# Patient Record
Sex: MALE | Race: WHITE | NOT HISPANIC OR LATINO | Employment: OTHER | URBAN - METROPOLITAN AREA
[De-identification: names, ages, dates, MRNs, and addresses within clinical notes are randomized per-mention and may not be internally consistent; named-entity substitution may affect disease eponyms.]

---

## 2024-04-13 ENCOUNTER — HOSPITAL ENCOUNTER (EMERGENCY)
Facility: HOSPITAL | Age: 39
Discharge: HOME | End: 2024-04-13
Attending: EMERGENCY MEDICINE
Payer: COMMERCIAL

## 2024-04-13 ENCOUNTER — APPOINTMENT (OUTPATIENT)
Dept: RADIOLOGY | Facility: HOSPITAL | Age: 39
End: 2024-04-13
Payer: COMMERCIAL

## 2024-04-13 VITALS
HEART RATE: 62 BPM | BODY MASS INDEX: 25.11 KG/M2 | SYSTOLIC BLOOD PRESSURE: 122 MMHG | TEMPERATURE: 97.7 F | DIASTOLIC BLOOD PRESSURE: 82 MMHG | OXYGEN SATURATION: 99 % | RESPIRATION RATE: 16 BRPM | WEIGHT: 160 LBS | HEIGHT: 67 IN

## 2024-04-13 DIAGNOSIS — S61.411A LACERATION OF RIGHT HAND, FOREIGN BODY PRESENCE UNSPECIFIED, INITIAL ENCOUNTER: ICD-10-CM

## 2024-04-13 DIAGNOSIS — S56.021A LACERATION OF FLEXOR TENDON OF RIGHT THUMB: Primary | ICD-10-CM

## 2024-04-13 PROCEDURE — 2500000001 HC RX 250 WO HCPCS SELF ADMINISTERED DRUGS (ALT 637 FOR MEDICARE OP): Performed by: SURGERY

## 2024-04-13 PROCEDURE — 90471 IMMUNIZATION ADMIN: CPT | Performed by: SURGERY

## 2024-04-13 PROCEDURE — 99284 EMERGENCY DEPT VISIT MOD MDM: CPT | Mod: 25

## 2024-04-13 PROCEDURE — 96365 THER/PROPH/DIAG IV INF INIT: CPT | Mod: 59

## 2024-04-13 PROCEDURE — 2500000004 HC RX 250 GENERAL PHARMACY W/ HCPCS (ALT 636 FOR OP/ED): Performed by: SURGERY

## 2024-04-13 PROCEDURE — 12001 RPR S/N/AX/GEN/TRNK 2.5CM/<: CPT

## 2024-04-13 PROCEDURE — 2500000005 HC RX 250 GENERAL PHARMACY W/O HCPCS: Performed by: SURGERY

## 2024-04-13 PROCEDURE — 73130 X-RAY EXAM OF HAND: CPT | Mod: RT

## 2024-04-13 PROCEDURE — 73130 X-RAY EXAM OF HAND: CPT | Mod: RIGHT SIDE | Performed by: RADIOLOGY

## 2024-04-13 PROCEDURE — 90715 TDAP VACCINE 7 YRS/> IM: CPT | Performed by: SURGERY

## 2024-04-13 RX ORDER — LIDOCAINE HYDROCHLORIDE 10 MG/ML
20 INJECTION, SOLUTION EPIDURAL; INFILTRATION; INTRACAUDAL; PERINEURAL ONCE
Status: COMPLETED | OUTPATIENT
Start: 2024-04-13 | End: 2024-04-13

## 2024-04-13 RX ORDER — CEPHALEXIN 500 MG/1
500 CAPSULE ORAL ONCE
Status: COMPLETED | OUTPATIENT
Start: 2024-04-13 | End: 2024-04-13

## 2024-04-13 RX ORDER — CEPHALEXIN 500 MG/1
500 CAPSULE ORAL 3 TIMES DAILY
Qty: 15 CAPSULE | Refills: 0 | Status: SHIPPED | OUTPATIENT
Start: 2024-04-13 | End: 2024-04-18

## 2024-04-13 RX ADMIN — PIPERACILLIN SODIUM AND TAZOBACTAM SODIUM 3.38 G: 3; .375 INJECTION, SOLUTION INTRAVENOUS at 17:13

## 2024-04-13 RX ADMIN — LIDOCAINE HYDROCHLORIDE 200 MG: 10 INJECTION, SOLUTION EPIDURAL; INFILTRATION; INTRACAUDAL; PERINEURAL at 16:56

## 2024-04-13 RX ADMIN — CEPHALEXIN 500 MG: 500 CAPSULE ORAL at 17:59

## 2024-04-13 RX ADMIN — TETANUS TOXOID, REDUCED DIPHTHERIA TOXOID AND ACELLULAR PERTUSSIS VACCINE, ADSORBED 0.5 ML: 5; 2.5; 8; 8; 2.5 SUSPENSION INTRAMUSCULAR at 16:56

## 2024-04-13 ASSESSMENT — COLUMBIA-SUICIDE SEVERITY RATING SCALE - C-SSRS
6. HAVE YOU EVER DONE ANYTHING, STARTED TO DO ANYTHING, OR PREPARED TO DO ANYTHING TO END YOUR LIFE?: NO
2. HAVE YOU ACTUALLY HAD ANY THOUGHTS OF KILLING YOURSELF?: NO
1. IN THE PAST MONTH, HAVE YOU WISHED YOU WERE DEAD OR WISHED YOU COULD GO TO SLEEP AND NOT WAKE UP?: NO

## 2024-04-13 ASSESSMENT — PAIN - FUNCTIONAL ASSESSMENT: PAIN_FUNCTIONAL_ASSESSMENT: 0-10

## 2024-04-13 ASSESSMENT — PAIN DESCRIPTION - ORIENTATION: ORIENTATION: RIGHT

## 2024-04-13 ASSESSMENT — PAIN DESCRIPTION - LOCATION: LOCATION: FINGER (COMMENT WHICH ONE)

## 2024-04-13 ASSESSMENT — PAIN SCALES - GENERAL: PAINLEVEL_OUTOF10: 9

## 2024-04-13 NOTE — Clinical Note
Bubba Lew was seen and treated in our emergency department on 4/13/2024.  He may return to work on 04/17/2024.       If you have any questions or concerns, please don't hesitate to call.      Carlos Yuen PA-C

## 2024-04-13 NOTE — ED TRIAGE NOTES
Patient presented to ed due laceration to right thumb/ palm, patient was moving pool heater during incident. 9/10 pain.

## 2024-04-13 NOTE — ED PROVIDER NOTES
Chief Complaint   Patient presents with    Finger Laceration     HPI:   Bubba Lew is an 38 y.o. male presenting with a laceration to his right hand.  He explains that he was moving a metal pool water heater when it slipped and cut his right hand.  Denies numbness or tingling.  Patient explains that he can move all 4 fingers besides the thumb.  Denies pain elsewhere throughout the body.  Tetanus vaccination status unknown patient explains greater than 5 years.  Denies fever chills or sweats.  No nausea or vomiting.  No chest pain or shortness of breath.    Medications: Suboxone  Soc HX:  No Known Allergies:  No past medical history on file.  No past surgical history on file.  No family history on file.     Physical Exam  Vitals and nursing note reviewed.   Constitutional:       General: He is not in acute distress.     Appearance: He is well-developed.   HENT:      Head: Normocephalic and atraumatic.      Right Ear: Tympanic membrane normal.      Left Ear: Tympanic membrane normal.      Mouth/Throat:      Mouth: Mucous membranes are moist.      Pharynx: Oropharynx is clear.   Eyes:      Extraocular Movements: Extraocular movements intact.      Conjunctiva/sclera: Conjunctivae normal.      Pupils: Pupils are equal, round, and reactive to light.   Cardiovascular:      Rate and Rhythm: Normal rate and regular rhythm.      Heart sounds: No murmur heard.  Pulmonary:      Effort: Pulmonary effort is normal. No respiratory distress.      Breath sounds: Normal breath sounds.   Abdominal:      Palpations: Abdomen is soft.      Tenderness: There is no abdominal tenderness.   Musculoskeletal:         General: No swelling.      Cervical back: Neck supple.   Skin:     General: Skin is warm and dry.      Capillary Refill: Capillary refill takes less than 2 seconds. 3-second cap refill on digits 2 through 5  4 second cap refill in the thumb     Comments: 2 cm curved laceration of the thenar eminence.    Neurological:       General: No focal deficit present.      Mental Status: He is alert and oriented to person, place, and time.   Psychiatric:         Mood and Affect: Mood normal.        VS: As documented in the triage note and EMR flowsheet from this visit were reviewed.    Medical Decision Making: This is a 38-year-old male presenting with a laceration to his right thumb.  On exam the patient does not have flexion, adduction, or opposition of the right thumb.  Patient does have extension of the thumb.  Cap refill on the thumb was about 4 seconds, on the rest of the digits 2 through 5 was 3 seconds.  The other fingers were able to make a fist without issue.  Good distal pulses.  Dr. Montilla was consulted and recommend the patient get 1 dose of IV antibiotics and be given p.o. antibiotics for home and follow-up outpatient.   Patient was placed in a thumb spica splint by medic aGgan Funk.  Neurovascular intact following placement.    ED Course as of 04/13/24 1641   Sat Apr 13, 2024   1628 Dr. Montilla was consulted and recommended patient get 1 dose of IV antibiotics, close the laceration in the ED, give p.o. antibiotics outpatient and schedule an appointment to follow-up in the office [BAKARI]      ED Course User Index  [BAKARI] Carlos Yuen PA-C         Diagnoses as of 04/13/24 1641   Laceration of flexor tendon of right thumb   Laceration of right hand, foreign body presence unspecified, initial encounter     Laceration Repair    Performed by: Carlos Yuen PA-C  Authorized by: Carlos Yuen PA-C    Consent:     Consent obtained:  Verbal    Consent given by:  Patient    Risks, benefits, and alternatives were discussed: yes      Risks discussed:  Infection, need for additional repair and tendon damage  Universal protocol:     Patient identity confirmed:  Verbally with patient  Anesthesia:     Anesthesia method:  Local infiltration    Local anesthetic:  Lidocaine 1% w/o epi  Laceration details:     Location: Thenar eminence.    Length (cm):   2  Pre-procedure details:     Preparation:  Patient was prepped and draped in usual sterile fashion  Exploration:     Limited defect created (wound extended): no      Hemostasis achieved with:  Direct pressure    Imaging obtained: x-ray      Imaging outcome: foreign body not noted      Wound exploration: wound explored through full range of motion      Wound extent: muscle damage      Contaminated: no    Treatment:     Area cleansed with:  Povidone-iodine    Amount of cleaning:  Standard    Irrigation solution:  Sterile saline    Irrigation method:  Pressure wash    Debridement:  None    Undermining:  None    Scar revision: no    Skin repair:     Repair method:  Sutures    Suture size:  4-0    Suture material:  Prolene    Suture technique:  Simple interrupted  Approximation:     Approximation:  Close  Repair type:     Repair type:  Simple  Post-procedure details:     Dressing:  Splint for protection    Procedure completion:  Tolerated well, no immediate complications       Chronic Medical Conditions Significantly Affecting Care:      Escalation of Care: Appropriate for outpatient    Prescription Drug Consideration: Keflex    Counseling: Spoke with the patient and discussed today´s findings, in addition to providing specific details for the plan of care and expected course.  Patient was given the opportunity to ask questions.    Discussed return precautions and importance of follow-up.  Advised to follow-up with orthopedics.  Advised to return to the ED for changing or worsening symptoms, new symptoms, complaint specific precautions, and precautions listed on the discharge paperwork.  Educated on the common potential side effects of medications prescribed.    I advised the patient that the emergency evaluation and treatment provided today doesn't end their need for medical care. It is very important that they follow-up with their primary care provider or other specialist as instructed.    The plan of care was  mutually agreed upon with the patient. The patient and/or family were given the opportunity to ask questions. All questions asked today in the ED were answered to the best of my ability with today's information.    I specifically advised the patient to return to the ED for changing or worsening symptoms, worrisome new symptoms, or for any complaint specific precautions listed on the discharge paperwork.    This patient was cared for in the setting of nationwide stress on resources and staffing.    This report was transcribed using voice recognition software.  Every effort was made to ensure accuracy, however, inadvertently computerized transcription errors may be present.       Carlos Yuen PA-C  04/14/24 1002

## 2024-04-15 DIAGNOSIS — S56.021A LACERATION OF FLEXOR TENDON OF RIGHT THUMB: ICD-10-CM

## 2024-04-15 NOTE — H&P (VIEW-ONLY)
[facility]  Hand and Upper Extremity Service  Initial evaluation / Consultation         Consult requested by Referring Physician: ANASTASIA Reeves    Chief Complaint: Right hand injury          38 y.o otherwise healthy right hand dominant male presenting for injury of right finger. He was seen at the [facility] on 1/15/18 for an injury he sustained to the right 4th finger while working on a car. Denies any crushing injury. Sustained a laceration injury around 4/6 to flexor surface of his right thumb in which he received care in the [facility] emergency department. They irrigated and closed the wound and presents now for follow up related to previous complaint. He reports that his pain is well controlled. Reports tenderness to flexor thumb.          Please refer to New Patient Intake Form scanned into patient's electronic record for self reported past medical history, past surgical history, medications, allergies, family history, social history and 10 point review of systems    Examination:  Constitutional: Oriented to person, place, and time.  Appears well-developed and well-nourished.  Head: Normocephalic and atraumatic.  Eyes: Pupils are equal, round, and reactive to light.  Cardiovascular: Intact distal pulses.  Pulmonary/Chest/Breast: Effort normal. No respiratory distress.  Neurological: Alert and oriented to person, place, and time.  Skin: Skin is warm and dry.  Psychiatric: normal mood and affect.  Behavior is normal.  Musculoskeletal: Right hand reveals well approximated wound across palmar surface of thumb is proximal to thumb PIP joint flexion increase. Thumb is resting in abduction at thumb CMC joint and apparent and intact function of f adductor palmaris brevis. No active thumb IP joint flexion. Thumb tip is well profuse and he reports intact sensation to radial and ulnar aspect to thumb.          Personal Interpretation of Diagnostic studies: No new images obtained             Impression: Right thumb laceration with suspected FPL tendon injury           Plan: We are going to place him into a new plaster thumb Splica Splint and will get him on the operating schedule later this week for exploration for FPL repair. We discussed our anticipated post-operative course and that he may not be able to return to his former occupation for 10-12 weeks. We will submit the appropriate [facility] paperwork relative to him being out of work for that time.            In Office Procedures Performed: None                  Medications Prescribed: None                    Follow up: Will call to schedule surgery               Yves Montilla MD  [facility]  Department of Orthopaedic Surgery  Hand and Upper Extremity Reconstruction      Scribe Attestation  By signing my name below, I, Jerald Weldon , Scrazul   attest that this documentation has been prepared under the direction and in the presence of Dr. Yves Montilla.      Dictation performed with the use of voice recognition software.  Syntax and grammatical errors may exist.

## 2024-04-15 NOTE — PROGRESS NOTES
[facility]  Hand and Upper Extremity Service  Initial evaluation / Consultation         Consult requested by Referring Physician: ANASTASIA Reeves    Chief Complaint: Right hand injury          38 y.o otherwise healthy right hand dominant male presenting for injury of right finger. He was seen at the [facility] on 1/15/18 for an injury he sustained to the right 4th finger while working on a car. Denies any crushing injury. Sustained a laceration injury around 4/6 to flexor surface of his right thumb in which he received care in the [facility] emergency department. They irrigated and closed the wound and presents now for follow up related to previous complaint. He reports that his pain is well controlled. Reports tenderness to flexor thumb.          Please refer to New Patient Intake Form scanned into patient's electronic record for self reported past medical history, past surgical history, medications, allergies, family history, social history and 10 point review of systems    Examination:  Constitutional: Oriented to person, place, and time.  Appears well-developed and well-nourished.  Head: Normocephalic and atraumatic.  Eyes: Pupils are equal, round, and reactive to light.  Cardiovascular: Intact distal pulses.  Pulmonary/Chest/Breast: Effort normal. No respiratory distress.  Neurological: Alert and oriented to person, place, and time.  Skin: Skin is warm and dry.  Psychiatric: normal mood and affect.  Behavior is normal.  Musculoskeletal: Right hand reveals well approximated wound across palmar surface of thumb is proximal to thumb PIP joint flexion increase. Thumb is resting in abduction at thumb CMC joint and apparent and intact function of f adductor palmaris brevis. No active thumb IP joint flexion. Thumb tip is well profuse and he reports intact sensation to radial and ulnar aspect to thumb.          Personal Interpretation of Diagnostic studies: No new images obtained             Impression: Right thumb laceration with suspected FPL tendon injury           Plan: We are going to place him into a new plaster thumb Splica Splint and will get him on the operating schedule later this week for exploration for FPL repair. We discussed our anticipated post-operative course and that he may not be able to return to his former occupation for 10-12 weeks. We will submit the appropriate [facility] paperwork relative to him being out of work for that time.            In Office Procedures Performed: None                  Medications Prescribed: None                    Follow up: Will call to schedule surgery               Yves Montilla MD  [facility]  Department of Orthopaedic Surgery  Hand and Upper Extremity Reconstruction      Scribe Attestation  By signing my name below, I, Jerald Weldon , Scrazul   attest that this documentation has been prepared under the direction and in the presence of Dr. Yves Montilla.      Dictation performed with the use of voice recognition software.  Syntax and grammatical errors may exist.

## 2024-04-16 ENCOUNTER — OFFICE VISIT (OUTPATIENT)
Dept: ORTHOPEDIC SURGERY | Facility: CLINIC | Age: 39
End: 2024-04-16
Payer: COMMERCIAL

## 2024-04-16 VITALS — WEIGHT: 160 LBS | BODY MASS INDEX: 25.11 KG/M2 | HEIGHT: 67 IN

## 2024-04-16 DIAGNOSIS — S56.021A LACERATION OF FLEXOR TENDON OF RIGHT THUMB: Primary | ICD-10-CM

## 2024-04-16 PROCEDURE — 99214 OFFICE O/P EST MOD 30 MIN: CPT | Performed by: ORTHOPAEDIC SURGERY

## 2024-04-16 ASSESSMENT — PAIN SCALES - GENERAL: PAINLEVEL_OUTOF10: 5 - MODERATE PAIN

## 2024-04-16 ASSESSMENT — PAIN - FUNCTIONAL ASSESSMENT: PAIN_FUNCTIONAL_ASSESSMENT: 0-10

## 2024-04-18 ENCOUNTER — ANESTHESIA EVENT (OUTPATIENT)
Dept: OPERATING ROOM | Facility: CLINIC | Age: 39
End: 2024-04-18
Payer: COMMERCIAL

## 2024-04-19 ENCOUNTER — ANESTHESIA (OUTPATIENT)
Dept: OPERATING ROOM | Facility: CLINIC | Age: 39
End: 2024-04-19
Payer: COMMERCIAL

## 2024-04-19 ENCOUNTER — HOSPITAL ENCOUNTER (OUTPATIENT)
Facility: CLINIC | Age: 39
Setting detail: OUTPATIENT SURGERY
Discharge: HOME | End: 2024-04-19
Attending: ORTHOPAEDIC SURGERY | Admitting: ORTHOPAEDIC SURGERY
Payer: COMMERCIAL

## 2024-04-19 VITALS
TEMPERATURE: 98.4 F | DIASTOLIC BLOOD PRESSURE: 61 MMHG | HEIGHT: 67 IN | BODY MASS INDEX: 24.29 KG/M2 | HEART RATE: 59 BPM | OXYGEN SATURATION: 96 % | WEIGHT: 154.76 LBS | SYSTOLIC BLOOD PRESSURE: 112 MMHG | RESPIRATION RATE: 16 BRPM

## 2024-04-19 DIAGNOSIS — S56.021A LACERATION OF FLEXOR TENDON OF RIGHT THUMB: Primary | ICD-10-CM

## 2024-04-19 PROCEDURE — 2720000007 HC OR 272 NO HCPCS: Performed by: ORTHOPAEDIC SURGERY

## 2024-04-19 PROCEDURE — 7100000010 HC PHASE TWO TIME - EACH INCREMENTAL 1 MINUTE: Performed by: ORTHOPAEDIC SURGERY

## 2024-04-19 PROCEDURE — 3600000003 HC OR TIME - INITIAL BASE CHARGE - PROCEDURE LEVEL THREE: Performed by: ORTHOPAEDIC SURGERY

## 2024-04-19 PROCEDURE — 2500000004 HC RX 250 GENERAL PHARMACY W/ HCPCS (ALT 636 FOR OP/ED): Performed by: ANESTHESIOLOGY

## 2024-04-19 PROCEDURE — 2500000001 HC RX 250 WO HCPCS SELF ADMINISTERED DRUGS (ALT 637 FOR MEDICARE OP): Performed by: ANESTHESIOLOGY

## 2024-04-19 PROCEDURE — 3700000001 HC GENERAL ANESTHESIA TIME - INITIAL BASE CHARGE: Performed by: ORTHOPAEDIC SURGERY

## 2024-04-19 PROCEDURE — 2500000004 HC RX 250 GENERAL PHARMACY W/ HCPCS (ALT 636 FOR OP/ED): Performed by: NURSE ANESTHETIST, CERTIFIED REGISTERED

## 2024-04-19 PROCEDURE — A26352 PR REPAIR FLEXOR TENDON,HAND,W/ GRAFT,EA: Performed by: NURSE ANESTHETIST, CERTIFIED REGISTERED

## 2024-04-19 PROCEDURE — 15240 FTH/GFT F/C/C/M/N/AX/G/H/F20: CPT | Performed by: ORTHOPAEDIC SURGERY

## 2024-04-19 PROCEDURE — 2500000004 HC RX 250 GENERAL PHARMACY W/ HCPCS (ALT 636 FOR OP/ED): Mod: JZ | Performed by: ORTHOPAEDIC SURGERY

## 2024-04-19 PROCEDURE — 2500000004 HC RX 250 GENERAL PHARMACY W/ HCPCS (ALT 636 FOR OP/ED): Performed by: ORTHOPAEDIC SURGERY

## 2024-04-19 PROCEDURE — 3700000002 HC GENERAL ANESTHESIA TIME - EACH INCREMENTAL 1 MINUTE: Performed by: ORTHOPAEDIC SURGERY

## 2024-04-19 PROCEDURE — 3600000008 HC OR TIME - EACH INCREMENTAL 1 MINUTE - PROCEDURE LEVEL THREE: Performed by: ORTHOPAEDIC SURGERY

## 2024-04-19 PROCEDURE — A26352 PR REPAIR FLEXOR TENDON,HAND,W/ GRAFT,EA: Performed by: ANESTHESIOLOGY

## 2024-04-19 PROCEDURE — 2500000005 HC RX 250 GENERAL PHARMACY W/O HCPCS: Performed by: ANESTHESIOLOGY

## 2024-04-19 PROCEDURE — 64831 REPAIR OF DIGIT NERVE: CPT | Performed by: ORTHOPAEDIC SURGERY

## 2024-04-19 PROCEDURE — 2500000001 HC RX 250 WO HCPCS SELF ADMINISTERED DRUGS (ALT 637 FOR MEDICARE OP): Performed by: ORTHOPAEDIC SURGERY

## 2024-04-19 PROCEDURE — 2500000005 HC RX 250 GENERAL PHARMACY W/O HCPCS: Performed by: NURSE ANESTHETIST, CERTIFIED REGISTERED

## 2024-04-19 PROCEDURE — 7100000009 HC PHASE TWO TIME - INITIAL BASE CHARGE: Performed by: ORTHOPAEDIC SURGERY

## 2024-04-19 PROCEDURE — A4217 STERILE WATER/SALINE, 500 ML: HCPCS | Performed by: ORTHOPAEDIC SURGERY

## 2024-04-19 PROCEDURE — 7100000001 HC RECOVERY ROOM TIME - INITIAL BASE CHARGE: Performed by: ORTHOPAEDIC SURGERY

## 2024-04-19 PROCEDURE — 26352 REPAIR/GRAFT HAND TENDON: CPT | Performed by: ORTHOPAEDIC SURGERY

## 2024-04-19 PROCEDURE — 7100000002 HC RECOVERY ROOM TIME - EACH INCREMENTAL 1 MINUTE: Performed by: ORTHOPAEDIC SURGERY

## 2024-04-19 RX ORDER — DEXMEDETOMIDINE HYDROCHLORIDE 4 UG/ML
INJECTION, SOLUTION INTRAVENOUS CONTINUOUS PRN
Status: DISCONTINUED | OUTPATIENT
Start: 2024-04-19 | End: 2024-04-19

## 2024-04-19 RX ORDER — PHENYLEPHRINE HYDROCHLORIDE 10 MG/ML
INJECTION INTRAVENOUS AS NEEDED
Status: DISCONTINUED | OUTPATIENT
Start: 2024-04-19 | End: 2024-04-19

## 2024-04-19 RX ORDER — SODIUM CHLORIDE 0.9 G/100ML
IRRIGANT IRRIGATION AS NEEDED
Status: DISCONTINUED | OUTPATIENT
Start: 2024-04-19 | End: 2024-04-19 | Stop reason: HOSPADM

## 2024-04-19 RX ORDER — PROPOFOL 10 MG/ML
INJECTION, EMULSION INTRAVENOUS AS NEEDED
Status: DISCONTINUED | OUTPATIENT
Start: 2024-04-19 | End: 2024-04-19

## 2024-04-19 RX ORDER — HYDROCODONE BITARTRATE AND ACETAMINOPHEN 5; 325 MG/1; MG/1
1 TABLET ORAL EVERY 6 HOURS PRN
Qty: 28 TABLET | Refills: 0 | Status: SHIPPED | OUTPATIENT
Start: 2024-04-19 | End: 2024-04-26

## 2024-04-19 RX ORDER — SODIUM CHLORIDE, SODIUM LACTATE, POTASSIUM CHLORIDE, CALCIUM CHLORIDE 600; 310; 30; 20 MG/100ML; MG/100ML; MG/100ML; MG/100ML
100 INJECTION, SOLUTION INTRAVENOUS CONTINUOUS
Status: DISCONTINUED | OUTPATIENT
Start: 2024-04-19 | End: 2024-04-19 | Stop reason: HOSPADM

## 2024-04-19 RX ORDER — CEFAZOLIN SODIUM 2 G/100ML
2 INJECTION, SOLUTION INTRAVENOUS ONCE
Status: CANCELLED | OUTPATIENT
Start: 2024-04-19 | End: 2024-04-19

## 2024-04-19 RX ORDER — ONDANSETRON HYDROCHLORIDE 2 MG/ML
4 INJECTION, SOLUTION INTRAVENOUS ONCE AS NEEDED
Status: DISCONTINUED | OUTPATIENT
Start: 2024-04-19 | End: 2024-04-19 | Stop reason: HOSPADM

## 2024-04-19 RX ORDER — FENTANYL CITRATE 50 UG/ML
INJECTION, SOLUTION INTRAMUSCULAR; INTRAVENOUS AS NEEDED
Status: DISCONTINUED | OUTPATIENT
Start: 2024-04-19 | End: 2024-04-19

## 2024-04-19 RX ORDER — KETOROLAC TROMETHAMINE 30 MG/ML
INJECTION, SOLUTION INTRAMUSCULAR; INTRAVENOUS AS NEEDED
Status: DISCONTINUED | OUTPATIENT
Start: 2024-04-19 | End: 2024-04-19

## 2024-04-19 RX ORDER — LIDOCAINE IN NACL,ISO-OSMOT/PF 30 MG/3 ML
0.1 SYRINGE (ML) INJECTION ONCE
Status: DISCONTINUED | OUTPATIENT
Start: 2024-04-19 | End: 2024-04-19 | Stop reason: HOSPADM

## 2024-04-19 RX ORDER — LIDOCAINE HYDROCHLORIDE 20 MG/ML
INJECTION, SOLUTION INFILTRATION; PERINEURAL AS NEEDED
Status: DISCONTINUED | OUTPATIENT
Start: 2024-04-19 | End: 2024-04-19

## 2024-04-19 RX ORDER — BUPIVACAINE HYDROCHLORIDE 5 MG/ML
INJECTION, SOLUTION EPIDURAL; INTRACAUDAL AS NEEDED
Status: DISCONTINUED | OUTPATIENT
Start: 2024-04-19 | End: 2024-04-19 | Stop reason: HOSPADM

## 2024-04-19 RX ORDER — BACITRACIN ZINC 500 UNIT/G
OINTMENT IN PACKET (EA) TOPICAL AS NEEDED
Status: DISCONTINUED | OUTPATIENT
Start: 2024-04-19 | End: 2024-04-19 | Stop reason: HOSPADM

## 2024-04-19 RX ORDER — MIDAZOLAM HYDROCHLORIDE 1 MG/ML
INJECTION, SOLUTION INTRAMUSCULAR; INTRAVENOUS AS NEEDED
Status: DISCONTINUED | OUTPATIENT
Start: 2024-04-19 | End: 2024-04-19

## 2024-04-19 RX ORDER — FENTANYL CITRATE 50 UG/ML
25 INJECTION, SOLUTION INTRAMUSCULAR; INTRAVENOUS EVERY 5 MIN PRN
Status: DISCONTINUED | OUTPATIENT
Start: 2024-04-19 | End: 2024-04-19 | Stop reason: HOSPADM

## 2024-04-19 RX ORDER — CEFAZOLIN 1 G/1
INJECTION, POWDER, FOR SOLUTION INTRAVENOUS AS NEEDED
Status: DISCONTINUED | OUTPATIENT
Start: 2024-04-19 | End: 2024-04-19

## 2024-04-19 RX ORDER — OXYCODONE HYDROCHLORIDE 5 MG/1
5 TABLET ORAL EVERY 4 HOURS PRN
Status: DISCONTINUED | OUTPATIENT
Start: 2024-04-19 | End: 2024-04-19 | Stop reason: HOSPADM

## 2024-04-19 RX ORDER — ONDANSETRON HYDROCHLORIDE 2 MG/ML
INJECTION, SOLUTION INTRAVENOUS AS NEEDED
Status: DISCONTINUED | OUTPATIENT
Start: 2024-04-19 | End: 2024-04-19

## 2024-04-19 RX ORDER — OXYCODONE HYDROCHLORIDE 10 MG/1
10 TABLET ORAL EVERY 4 HOURS PRN
Status: DISCONTINUED | OUTPATIENT
Start: 2024-04-19 | End: 2024-04-19 | Stop reason: HOSPADM

## 2024-04-19 RX ADMIN — DEXAMETHASONE SODIUM PHOSPHATE 4 MG: 4 INJECTION, SOLUTION INTRAMUSCULAR; INTRAVENOUS at 10:51

## 2024-04-19 RX ADMIN — SODIUM CHLORIDE, SODIUM LACTATE, POTASSIUM CHLORIDE, AND CALCIUM CHLORIDE 100 ML/HR: .6; .31; .03; .02 INJECTION, SOLUTION INTRAVENOUS at 10:30

## 2024-04-19 RX ADMIN — SODIUM CHLORIDE, SODIUM LACTATE, POTASSIUM CHLORIDE, AND CALCIUM CHLORIDE: .6; .31; .03; .02 INJECTION, SOLUTION INTRAVENOUS at 10:29

## 2024-04-19 RX ADMIN — FENTANYL CITRATE 50 MCG: 50 INJECTION, SOLUTION INTRAMUSCULAR; INTRAVENOUS at 10:45

## 2024-04-19 RX ADMIN — CEFAZOLIN 2 G: 1 INJECTION, POWDER, FOR SOLUTION INTRAMUSCULAR; INTRAVENOUS at 10:49

## 2024-04-19 RX ADMIN — Medication 6 L/MIN: at 12:25

## 2024-04-19 RX ADMIN — ONDANSETRON 4 MG: 2 INJECTION INTRAMUSCULAR; INTRAVENOUS at 12:12

## 2024-04-19 RX ADMIN — MIDAZOLAM 2 MG: 1 INJECTION INTRAMUSCULAR; INTRAVENOUS at 10:42

## 2024-04-19 RX ADMIN — KETOROLAC TROMETHAMINE 30 MG: 30 INJECTION, SOLUTION INTRAMUSCULAR at 12:13

## 2024-04-19 RX ADMIN — FENTANYL CITRATE 25 MCG: 50 INJECTION, SOLUTION INTRAMUSCULAR; INTRAVENOUS at 13:00

## 2024-04-19 RX ADMIN — LIDOCAINE HYDROCHLORIDE 60 MG: 20 INJECTION, SOLUTION INFILTRATION; PERINEURAL at 10:45

## 2024-04-19 RX ADMIN — PROPOFOL 50 MG: 10 INJECTION, EMULSION INTRAVENOUS at 10:47

## 2024-04-19 RX ADMIN — PROPOFOL 200 MG: 10 INJECTION, EMULSION INTRAVENOUS at 10:45

## 2024-04-19 RX ADMIN — FENTANYL CITRATE 25 MCG: 50 INJECTION, SOLUTION INTRAMUSCULAR; INTRAVENOUS at 12:50

## 2024-04-19 RX ADMIN — OXYCODONE HYDROCHLORIDE 10 MG: 10 TABLET ORAL at 13:10

## 2024-04-19 RX ADMIN — PHENYLEPHRINE HYDROCHLORIDE 80 MCG: 10 INJECTION INTRAVENOUS at 11:17

## 2024-04-19 RX ADMIN — FENTANYL CITRATE 25 MCG: 50 INJECTION, SOLUTION INTRAMUSCULAR; INTRAVENOUS at 12:13

## 2024-04-19 SDOH — HEALTH STABILITY: MENTAL HEALTH: CURRENT SMOKER: 0

## 2024-04-19 ASSESSMENT — PAIN - FUNCTIONAL ASSESSMENT
PAIN_FUNCTIONAL_ASSESSMENT: 0-10
PAIN_FUNCTIONAL_ASSESSMENT: UNABLE TO SELF-REPORT
PAIN_FUNCTIONAL_ASSESSMENT: 0-10

## 2024-04-19 ASSESSMENT — PAIN SCALES - GENERAL
PAINLEVEL_OUTOF10: 7
PAINLEVEL_OUTOF10: 8
PAINLEVEL_OUTOF10: 3
PAINLEVEL_OUTOF10: 7
PAINLEVEL_OUTOF10: 5 - MODERATE PAIN
PAINLEVEL_OUTOF10: 8
PAINLEVEL_OUTOF10: 6

## 2024-04-19 ASSESSMENT — PAIN DESCRIPTION - ORIENTATION: ORIENTATION: RIGHT

## 2024-04-19 NOTE — OP NOTE
Tendon repair right thumb / 45 Minutes (R) Operative Note     Date: 2024  OR Location: [facility] OR    Name: Bbuba Lew, : 1985, Age: 38 y.o., MRN: [id number], Sex: male    Diagnosis  Pre-op Diagnosis     * Laceration of flexor tendon of right thumb [S56.021A] Post-op Diagnosis     * Laceration of flexor tendon of right thumb [S56.021A]     Procedures  Repair right thumb FPL tendon  Repair ulnar digital nerve right thumb  Full-thickness skin graft 25 mm x 15 mm right thenar eminence    Surgeons      * Yves Montilla - Primary    Resident/Fellow/Other Assistant:  Tito Toscano MD    Procedure Summary  Anesthesia: General  ASA: ASA status not filed in the log.  Anesthesia Staff: Anesthesiologist: Niharika Asif MD  CRNA: CÉSAR Segal-ANTON  SRNA: Ruma Sumaya  Estimated Blood Loss: 2 mL  Intra-op Medications:   Administrations occurring from 1050 to 1205 on 24:   Medication Name Total Dose   sodium chloride 0.9 % irrigation solution 150 mL   bupivacaine PF (Marcaine) 0.5 % (5 mg/mL) injection 10 mL              Anesthesia Record               Intraprocedure I/O Totals          Intake    lactated Ringer's infusion 800.00 mL    Total Intake 800 mL          Specimen: No specimens collected     Staff:   Relief Circulator: Lu Elliott RN  Relief Scrub: Laura Saul RN  Scrub Person: Zeferino Shelton         Drains and/or Catheters:   Open Drain Right;Anterior  (Active)       Tourniquet Times:     Total Tourniquet Time Documented:  Arm - Lower (Right) - 82 minutes  Total: Arm - Lower (Right) - 82 minutes      Implants:     Findings: Full-thickness skin necrosis from irregular traumatic flap, flexor tendon laceration right thumb zone 2, ulnar digital nerve injury right thumb    Indications: Bubba Lew is an 38 y.o. male who is having surgery for Laceration of flexor tendon of right thumb [S56.021A].      The patient was seen in the preoperative area. The risks, benefits, complications,  treatment options, non-operative alternatives, expected recovery and outcomes were discussed with the patient. The possibilities of reaction to medication, pulmonary aspiration, injury to surrounding structures, bleeding, recurrent infection, the need for additional procedures, failure to diagnose a condition, and creating a complication requiring transfusion or operation were discussed with the patient. The patient concurred with the proposed plan, giving informed consent.  The site of surgery was properly noted/marked if necessary per policy. The patient has been actively warmed in preoperative area. Preoperative antibiotics have been ordered and given within 1 hours of incision. Venous thrombosis prophylaxis have been ordered including bilateral sequential compression devices    Procedure Details:   Patient is a 38-year-old right-hand-dominant gentleman who sustained a traumatic laceration to his right thumb extending into the thenar eminence 6 days ago at work.  He was seen in the emergency department on day of injury and his wound was irrigated and closed.  He subsequently presented to my office earlier this week with evidence of a laceration of his FPL tendon.  He reported intact sensation at the tip of the thumb.  The thumb was well-perfused.  He presents now for exploration with tendon repair.  Preoperatively the right hand was identified and marked for surgery.  Informed consent process was completed.    Patient was brought to the operating room placed supine on the operating table.  A timeout procedure was performed to verify the correct patient, procedure and operative site.  Intravenous antibiotics were administered.  General anesthetic was initiated by the anesthesia service.  The right upper extremity was then prepped and draped in usual sterile fashion.  The limb was exsanguinated and a tourniquet was inflated to 250 mmHg.    We first inspected our wound.  The flaps were all reapproximated but the  wound was a U-shaped flap with its proximal apex just proximal to the MP joint flexion crease of the thumb.  It then extended distally along the ulnar border of the thumb out to the level of the IP joint.  The proximal aspect of this U-shaped flap was ischemic and necrotic and nonviable.    We removed the sutures.  We decompressed hematoma from the thumb.  We copiously irrigated the wounds and began with our exploration.  We very clearly identified traumatic laceration involving the ulnar neurovascular bundle.  We identified the proximal aspects of the ulnar digital nerve and ulnar digital artery.  However to further identify the distal segments we had to extend the traumatic wound in Matt fashion distally and extending it across the pulp of the thumb so we could also expose the distal aspect of the FPL tendon.  We identified now the proximal and distal aspects of the ulnar digital nerve.  Proximally we explored the radial digital nerve and this structure was intact.  We explored the flexor tendon sheath.  We identified that the A1 pulley was intact but that the oblique pulley and the majority of the A2 pulley were disrupted.  We identified the terminal aspect of the FPL tendon.  There was perhaps 15 to 20 mm of the terminal FPL tendon still attached to the distal phalanx.  We dissected then proximally into the thenar eminence and identified the retracted proximal portion of the FPL tendon.  We placed a 3-0 Ethibond suture in the proximal FPL tendon.  We are then able to use the suture strands to feed the proximal portion of the tendon underneath the A1 pulley and bring it all the way out to the terminal FPL.  The wounds were irrigated again and then we repaired the FPL tendon with a 6 strand core suture technique.  There was some slight bowstringing because of the incompetence of the oblique and A2 pulley but the A1 pulley remaining intact minimize the degree of bowstringing.    We turned our attention now to the  ulnar neurovascular bundle.  Because the thumb was so well-perfused we cauterized the proximal aspect of the ulnar digital artery.  We then debrided the ends of the ulnar digital nerve proximally distally and performed a epineurial repair that was tension-free.  This was done with 8-0 nylon under loupe magnification.    The wound was copiously irrigated again and we began to close our wound.  We are able to close the portion of the wound on the ulnar border of the thumb but as we further inspected this proximal aspect of his U-shaped flapped turning that it was nonviable we excised it with the intention of now applying a full-thickness skin graft.  Fortunately the defect left by excision of this nonviable skin was over thenar muscle.  The tendon and tendon sheath were not exposed.  The neurovascular bundles that were repaired were not exposed.    Went to the proximal aspect of the volar forearm try to find an area of the forearm that had minimal hair growth.  We harvested a full-thickness skin graft measuring the dimensions of the defect in the hand.  This was 25 mm x 15 mm.  The donor site was irrigated and then closed primarily.    We then took the skin graft to the thumb wound.  We defatted the skin graft and then inset into the defect closing this with interrupted 4-0 chromic suture.  We placed a vessel loop underneath the skin graft in an effort to allow decompression of any hematoma that would develop.    Sterile bandages were then applied.  The tourniquet was deflated.  The hemostasis was demonstrated.  The thumb was briskly reperfused.  We placed the patient in a well-padded dorsal blocking thumb spica splint with pressure applied over the thenar eminence to help create a bolster effect to her skin graft.  He was awoken from his anesthetic and transferred to recovery in stable condition.    Postoperatively he will be discharged home is comfortable.  He will remain in his splint until he returns to clinic in  4 days.  At that time we will remove his bandage and inspect his wounds.  We will refer him to therapy for wound care and rehabilitation of his flexor tendon repair.        Complications:  None; patient tolerated the procedure well.    Disposition: PACU - hemodynamically stable.  Condition: stable         Additional Details:      Attending Attestation: I was present and scrubbed for the entire procedure.    Yves Montilla  Phone Number: [phone]

## 2024-04-19 NOTE — ANESTHESIA PROCEDURE NOTES
Airway  Date/Time: 4/19/2024 10:48 AM  Urgency: elective    Airway not difficult    Staffing  Performed: attending and SRNA   Authorized by: Niharika Asif MD    Performed by: CÉSAR Segal-ANTON  Patient location during procedure: OR    Indications and Patient Condition  Indications for airway management: anesthesia and airway protection  Spontaneous ventilation: present  Sedation level: deep  Preoxygenated: yes  Patient position: sniffing  Mask difficulty assessment: 1 - vent by mask  Planned trial extubation    Final Airway Details  Final airway type: supraglottic airway      Successful airway: Supraglottic airway: I gel.  Size 4     Number of attempts at approach: 1    Additional Comments  Lips and teeth in preinduction condition

## 2024-04-19 NOTE — ANESTHESIA PREPROCEDURE EVALUATION
"Patient: Bubba Lew    Procedure Information       Anesthesia Start Date/Time: 04/19/24 1028    Procedure: Tendon repair right thumb / 45 Minutes (Right: Thumb)    Location: [facility] OR 02 / [facility]    Surgeons: Yves Montilla MD          Vitals:    04/19/24 1010   BP: 142/81   Pulse: 51   Resp: 16   Temp: 36.9 °C (98.4 °F)   SpO2: 100%       Past Surgical History:   Procedure Laterality Date   • TONSILLECTOMY       Past Medical History:   Diagnosis Date   • Asthma ([facility])    • Back pain    • Congenital spondylolisthesis    • Fibromyalgia, primary    • Laceration of flexor tendon of right thumb    • Tonsillitis        Current Facility-Administered Medications:   •  lactated Ringer's infusion, 100 mL/hr, intravenous, Continuous, Niharika Asif MD, Last Rate: 100 mL/hr at 04/19/24 1030, Continued by Anesthesia at 04/19/24 1030  Prior to Admission medications    Medication Sig Start Date End Date Taking? Authorizing Provider   albuterol sulfate (Proair Digihaler) 90 mcg/actuation aero powdr breath act w/sensor inhaler Inhale 2 puffs every 4 hours if needed.    Historical Provider, MD   cephalexin (Keflex) 500 mg capsule Take 1 capsule (500 mg) by mouth 3 times a day for 5 days. 4/13/24 4/18/24  Carlos Yuen PA-C   HYDROcodone-acetaminophen (Norco) 5-325 mg tablet Take 1 tablet by mouth every 6 hours if needed for severe pain (7 - 10) for up to 7 days. 4/19/24 4/26/24  Yves Montilla MD     Allergies   Allergen Reactions   • Penicillins Unknown     Social History     Tobacco Use   • Smoking status: Never   • Smokeless tobacco: Current     Types: Chew   Substance Use Topics   • Alcohol use: Not Currently         Chemistry    No results found for: \"NA\", \"K\", \"CL\", \"CO2\", \"BUN\", \"CREATININE\", \"GLU\" No results found for: \"CALCIUM\", \"ALKPHOS\", \"AST\", \"ALT\", \"BILITOT\"       No results found for: \"WBC\", \"HGB\", \"HCT\", \"PLT\"  No results found for: \"PROTIME\", \"PTT\", \"INR\"  No results found for this or any " previous visit (from the past 4464 hour(s)).  No results found for this or any previous visit from the past 1095 days.        Relevant Problems   No relevant active problems       Clinical information reviewed:   Tobacco  Allergies  Meds   Med Hx  Surg Hx   Fam Hx  Soc Hx        NPO Detail:  NPO/Void Status  Date of Last Liquid: 04/18/24  Time of Last Liquid: 2000  Date of Last Solid: 04/18/24  Time of Last Solid: 2000         Physical Exam    Airway  Mallampati: I  TM distance: >3 FB  Neck ROM: full     Cardiovascular   Rhythm: regular  Rate: normal     Dental - normal exam     Pulmonary   Breath sounds clear to auscultation     Abdominal        Anesthesia Plan    History of general anesthesia?: yes  History of complications of general anesthesia?: no    ASA 1     general   (Per patient, has woken up during dental surgery (most likely MAC).   Chewable tobacco use.   )  The patient is not a current smoker.    intravenous induction   Anesthetic plan and risks discussed with patient.    Plan discussed with CRNA.

## 2024-04-19 NOTE — ANESTHESIA POSTPROCEDURE EVALUATION
Patient: Bubba Lew    Procedure Summary       Date: 04/19/24 Room / Location: [facility] / [facility]    Anesthesia Start: 1028 Anesthesia Stop: 1238    Procedure: Tendon repair right thumb / 45 Minutes (Right: Thumb) Diagnosis:       Laceration of flexor tendon of right thumb      (Laceration of flexor tendon of right thumb [S56.021A])    Surgeons: Yves Montilla MD Responsible Provider: Niharika Asif MD    Anesthesia Type: general ASA Status: Not recorded            Anesthesia Type: general    Vitals Value Taken Time   /62 04/19/24 1310   Temp 36.9 °C (98.4 °F) 04/19/24 1310   Pulse 61 04/19/24 1310   Resp 16 04/19/24 1310   SpO2 97 % 04/19/24 1310       Anesthesia Post Evaluation    Patient participation: complete - patient participated  Level of consciousness: awake and alert  Pain management: adequate  Airway patency: patent  Cardiovascular status: acceptable  Respiratory status: acceptable  Hydration status: acceptable  Postoperative Nausea and Vomiting: none    No notable events documented.

## 2024-04-23 ENCOUNTER — OFFICE VISIT (OUTPATIENT)
Dept: ORTHOPEDIC SURGERY | Facility: HOSPITAL | Age: 39
End: 2024-04-23
Payer: COMMERCIAL

## 2024-04-23 ENCOUNTER — CLINICAL SUPPORT (OUTPATIENT)
Dept: OCCUPATIONAL THERAPY | Facility: HOSPITAL | Age: 39
End: 2024-04-23
Payer: COMMERCIAL

## 2024-04-23 VITALS — BODY MASS INDEX: 24.17 KG/M2 | HEIGHT: 67 IN | WEIGHT: 154 LBS

## 2024-04-23 DIAGNOSIS — S56.021A LACERATION OF FLEXOR TENDON OF RIGHT THUMB: Primary | ICD-10-CM

## 2024-04-23 DIAGNOSIS — S56.021A LACERATION OF FLEXOR TENDON OF RIGHT THUMB: ICD-10-CM

## 2024-04-23 PROCEDURE — 4004F PT TOBACCO SCREEN RCVD TLK: CPT | Performed by: ORTHOPAEDIC SURGERY

## 2024-04-23 PROCEDURE — L3808 WHFO, RIGID W/O JOINTS: HCPCS | Performed by: OCCUPATIONAL THERAPIST

## 2024-04-23 PROCEDURE — 99024 POSTOP FOLLOW-UP VISIT: CPT | Performed by: ORTHOPAEDIC SURGERY

## 2024-04-23 ASSESSMENT — PAIN - FUNCTIONAL ASSESSMENT: PAIN_FUNCTIONAL_ASSESSMENT: NO/DENIES PAIN

## 2024-04-23 NOTE — PROGRESS NOTES
Surgery(s) performed: Repair right thumb FPL tendon; Repair ulnar digital nerve right thumb; Full-thickness skin graft 25 mm x 15 mm right thenar eminence.    Patient presents for first post-operative visit since tendon nerve repair and skin grafting on 4/19/24. He is overall doing well, pain is well controlled.      Exam findings: Surgical wounds are healing nicely. Skin graft over thenar eminence has good appearance. His thumb tip is perfused.  He is insensate on the ulnar border but he has good resting posture of the thumb IP joint. His skin graft donor site on the forearm appears to be healing without adverse event       Impression: Right thumb tendon and nerve repair    Plan: We discussed our intraoperative findings and our plan moving forward. He is going to see therapy today for splinting and the initiation of flexor tendon repair rehabilitation protocol. Will follow up with me in two weeks for wound check, and at that point we should be able to remove the nylon stitches from this.       Yves Montilla MD      [facility] [employer]   Department of Orthopaedic Surgery   Chief of Hand and Upper Extremity Surgery   [facility]       Scribe Attestation  By signing my name below, IVianca Scribe, attest that this documentation has been prepared under the direction and in the presence of Yves Montilla MD.

## 2024-04-23 NOTE — PROGRESS NOTES
Occupational Therapy  Occupational Therapy Orthopedic Evaluation    Patient Name: Bubba Lew  MRN: [id number]  Today's Date: 4/23/2024     General:  Reason for visit: R thumb  Referred by: Dr. Montilla    Current Problem  1. Laceration of flexor tendon of right thumb  Referral to Occupational Therapy          Precautions: per post op protocol     Medical History Form: Reviewed (scanned into chart)    Subjective:   Chief Complaint: R thumb  Onset: 04/13/2024  HOOD: Sheet metal  DOI/DOS: 04/19/2024; Repair right thumb FPL tendon; Repair ulnar digital nerve right thumb; Full-thickness skin graft 25 mm x 15 mm right thenar eminence     Condition since injury:   same     PAIN     Location: volar thumb  Intensity: up to 10/10  Description: sharp    Relevant Information (PMH & Previous Tests/Imaging): Reviewed in chart    Prior Level of Function (PLOF)  Work/School: off work    Patients Living Environment: Reviewed and no concern    Primary Language: English    Red Flags: Do you have any of the following? No  Fever/chills, unexplained weight changes, dizziness/fainting, unexplained change in bowel or bladder functions, unexplained malaise or muscle weakness, night pain/sweats, numbness or tingling    Physical Observation: sutures intact, volar forearm had steri strips intact    Numbness/Tingling: numbness along ulnar tip of thumb      EDUCATION: home exercise program, plan of care, activity modifications, pain management, and injury pathology     Goals:    Plan of care was developed with input and agreement by the patient    Treatments:     Orthosis ()    30 min  Forearm based dorsal blocking orthosis with wrist neutral MCP in 15 degrees of flexion, and IP in 15 degrees of flexion.  Patient educated on passive MCP and IP flexion, short arc IP flexion.  Patient educated on full time wear of orthosis and compliance with wear schedule/home program.      Assessment: Patient is a 37 yo male  s/p repair right thumb FPL tendon;  Repair ulnar digital nerve right thumb; Full-thickness skin graft 25 mm x 15 mm right thenar eminence  resulting in limited participation in pain-free ADLs and inability to perform at their prior level of function. Pt would benefit from occupational therapy to address the impairments found & listed previously in the objective section in order to return to safe and pain-free ADLs and prior level of function.       Plan:      Patient to be scheduled for formal evaluation.       Abdoulaye East, OT

## 2024-05-02 ENCOUNTER — TREATMENT (OUTPATIENT)
Dept: OCCUPATIONAL THERAPY | Facility: HOSPITAL | Age: 39
End: 2024-05-02
Payer: COMMERCIAL

## 2024-05-02 DIAGNOSIS — S56.021A LACERATION OF FLEXOR TENDON OF RIGHT THUMB: Primary | ICD-10-CM

## 2024-05-02 PROCEDURE — 97165 OT EVAL LOW COMPLEX 30 MIN: CPT | Mod: GO | Performed by: OCCUPATIONAL THERAPIST

## 2024-05-02 PROCEDURE — 97110 THERAPEUTIC EXERCISES: CPT | Mod: GO | Performed by: OCCUPATIONAL THERAPIST

## 2024-05-02 ASSESSMENT — ENCOUNTER SYMPTOMS
DEPRESSION: 0
LOSS OF SENSATION IN FEET: 0
OCCASIONAL FEELINGS OF UNSTEADINESS: 0

## 2024-05-02 NOTE — PROGRESS NOTES
Occupational Therapy  Occupational Therapy Orthopedic Evaluation    Patient Name: Bubba Lew  MRN: [id number]  Today's Date: 5/2/2024     Insurance:  Visit number: 1 of 12  Authorization info: approved  Insurance Type: [employer]  Time:  Time Calculation  Start Time: 1420  Stop Time: 1455  Time Calculation (min): 35 min  OT Evaluation Time Entry  OT Evaluation (Low) Time Entry: 20  OT Therapeutic Procedures Time Entry  Manual Therapy Time Entry: 5  Therapeutic Exercise Time Entry: 10    Insurance Type: Payor: [employer] / Plan: [employer] / Product Type: *No Product type* /     General:  Reason for visit: R thumb  Referred by: Dr. Montilla    Current Problem  1. Laceration of flexor tendon of right thumb            Precautions: per post op protocol - reviewed with patient today       Medical History Form: Reviewed (scanned into chart)    Subjective:   Chief Complaint: R thumb  HOOD: moving a metal pool water heater when it slipped and cut his right hand   DOI:  04/13/2024  DOS: 04/19/2024; Repair right thumb FPL tendon, Repair ulnar digital nerve right thumb, Full-thickness skin graft 25 mm x 15 mm right thenar eminence    Hand Dominance: Right    Current Condition since injury:   better      PAIN     Location: volar aspect of thumb  Intensity: 2/10 up to 3/10  Description: sharp, stinging  Aggravating Factors: ROM  Relieving Factors:  Rest     Relevant Information (PMH & Previous Tests/Imaging): reviewed in chart    Prior Level of Function (PLOF)  Work/School:    Current ADL/IADL Status: homemaking     Patients Living Environment: Reviewed and no concern    Primary Language: English    Pt goals for therapy: improve functional use of R thumb for manipulating objects in hand, cooking, cleaning, eating, opening jars, work tasks, and various household chores.    Red Flags: Do you have any of the following? No  Fever/chills, unexplained weight changes, dizziness/fainting, unexplained change  in bowel or bladder functions, unexplained malaise or muscle weakness, night pain/sweats, numbness or tingling    Objective:  Wrist extension/flexion: 50/60  Distance to DPC:  2nd: 3 cm  3rd: 3 cm  4th: 3 cm  5th: 3 cm  Passive thumb flexion  MCP: 40  IP: 20    Physical Observation: sutures intact, minimal bleeding noted, moderate edema in thumb, patient presented without orthosis today    Numbness/Tingling: numbness along ulnar aspect of thumb    Outcome Measures:  Quick DASH: 75    EDUCATION: home exercise program, plan of care, activity modifications, pain management, and injury pathology     Goals:  Active       OT Goals       OT Goal 1       Start:  05/02/24    Expected End:  05/30/24       Patient to report compliance with orthosis wear schedule to protect surgical report and improve functional use of R thumb.         OT Goal 2       Start:  05/02/24    Expected End:  05/30/24       Achieve composite fist for manipulating objects in hand.         OT Goal 3       Start:  05/02/24    Expected End:  06/27/24       Improve Quick DASH to 15%.         OT Goal 4       Start:  05/02/24    Expected End:  06/27/24       Improve active thumb IP flexion to 60 degrees for handwriting.         OT Goal 5       Start:  05/02/24    Expected End:  06/27/24       Report max pain of 2/10 for work tasks.           Plan of care was developed with input and agreement by the patient    Treatments:    Low complexity evaluation   20 min    Therapeutic Exercise:   10 min  HEP education and completion : active composite fist, digit flexion stretch, active wrist flexion , passive thumb flexion in orthosis and allowing thumb to extend back into orthosis, short arc gentle thumb flexion.    Manual Therapy:     5 min  Therapist performed manual thumb and digit flexion stretches: MCP flexion and IP flexion.       Assessment: Patient is a 39 yo male  s/p R thumb laceration resulting in limited participation in pain-free ADLs and inability to  perform at their prior level of function. Pt would benefit from occupational therapy to address the impairments found & listed previously in the objective section in order to return to safe and pain-free ADLs and prior level of function.    Patient was educated on full time wear of dorsal blocking orthosis. Patient presented to clinic without orthosis today. Patient was educated on importance of compliance to protect surgical repair. Patient verbalized understanding today. Patient to begin formal therapy 2 times per week for 8 weeks. Currently patient has 12 visits approved.         Plan:      Planned Interventions include: therapeutic exercise, therapeutic activity, self-care home management, manual therapy, therapeutic activities, gait training, neuromuscular coordination, vasopneumatic, dry needling, aquatic therapy, electric stimulation, fluidotherapy, ultrasound, kinesiotaping, orthosis fabrication, wound care  Frequency: 2 x Week  Duration: 8 Weeks      Abdoulaye East OT

## 2024-05-07 ENCOUNTER — TREATMENT (OUTPATIENT)
Dept: OCCUPATIONAL THERAPY | Facility: HOSPITAL | Age: 39
End: 2024-05-07
Payer: COMMERCIAL

## 2024-05-07 ENCOUNTER — OFFICE VISIT (OUTPATIENT)
Dept: ORTHOPEDIC SURGERY | Facility: CLINIC | Age: 39
End: 2024-05-07
Payer: COMMERCIAL

## 2024-05-07 VITALS — HEIGHT: 67 IN | WEIGHT: 157 LBS | BODY MASS INDEX: 24.64 KG/M2

## 2024-05-07 DIAGNOSIS — S56.021A LACERATION OF FLEXOR TENDON OF RIGHT THUMB: Primary | ICD-10-CM

## 2024-05-07 PROCEDURE — 99024 POSTOP FOLLOW-UP VISIT: CPT | Performed by: ORTHOPAEDIC SURGERY

## 2024-05-07 PROCEDURE — 97110 THERAPEUTIC EXERCISES: CPT | Mod: GO | Performed by: OCCUPATIONAL THERAPIST

## 2024-05-07 PROCEDURE — 4004F PT TOBACCO SCREEN RCVD TLK: CPT | Performed by: ORTHOPAEDIC SURGERY

## 2024-05-07 PROCEDURE — 97010 HOT OR COLD PACKS THERAPY: CPT | Mod: GO | Performed by: OCCUPATIONAL THERAPIST

## 2024-05-07 ASSESSMENT — PAIN - FUNCTIONAL ASSESSMENT: PAIN_FUNCTIONAL_ASSESSMENT: NO/DENIES PAIN

## 2024-05-07 ASSESSMENT — ENCOUNTER SYMPTOMS
DEPRESSION: 0
LOSS OF SENSATION IN FEET: 0
OCCASIONAL FEELINGS OF UNSTEADINESS: 0

## 2024-05-07 NOTE — PROGRESS NOTES
[facility]  Hand and Upper Extremity Service  Follow up visit         Follow up for: Post operative follow up of right thumb     Interval History: Presenting for second post-operative visit since tendon nerve repair and skin grafting on 4/19/24. Overall doing well and going to therapy 2 x a week although not wearing splint at today's office visit.          Past medical history, medications, allergies, surgical history and review of systems are reviewed and otherwise unchanged when compared to last visit on 4/23/24         Examination:  Constitutional: Oriented to person, place, and time.  Appears well-developed and well-nourished.  Head: Normocephalic and atraumatic.  Eyes: Pupils are equal, round, and reactive to light.  Cardiovascular: Intact distal pulses.  Pulmonary/Chest/Breast: Effort normal. No respiratory distress.  Neurological: Alert and oriented to person, place, and time.  Skin: Skin is warm and dry.  Psychiatric: normal mood and affect.  Behavior is normal.  Musculoskeletal: Right hand reveals all wounds are healing nicely. Sutures are removed except for around skin graft which will heal on their own. Circumferential swelling with limitations to active and passive thumb IP joint flexion. Diminished sensation on ulnar border consistent with ulnar digital nerve injury.               Personal Interpretation of Diagnostic studies: No new images obtained         Impression: Right thumb laceration, tendon and nerve injury          Plan: He'll continue with his therapy program. I've cautioned him that he needs to be wearing a splint at all times except when performing his exercises. He shouldn't use the thumb for any resisted tension type activities at this time. He'll return in 4 weeks for wound check and advancement of his therapy program.           In Office Procedures Performed: None         Medical Decision Making:          Medications Prescribed: None                Follow up: 4 weeks             Yves Montilla MD  [facility]  Department of Orthopaedic Surgery  Hand and Upper Extremity Reconstruction    Scribe Attestation  By signing my name below, I, Leslie Kruger   attest that this documentation has been prepared under the direction and in the presence of Dr. Yves Montilla.    Dictation performed with the use of voice recognition software.  Syntax and grammatical errors may exist.

## 2024-05-07 NOTE — PROGRESS NOTES
Occupational Therapy  Occupational Therapy Orthopedic Treatment    Patient Name: Bubba Lew  MRN: [id number]  Today's Date: 5/7/2024     Insurance:  Visit number: 2 of 12  Authorization info: approved  Insurance Type: [facility]  Time:     Time:  Time Calculation  Start Time: 1350  Stop Time: 1420  Time Calculation (min): 30 min  OT Modalities Time Entry  Hot/Cold Pack Time Entry: 10  OT Therapeutic Procedures Time Entry  Manual Therapy Time Entry: 10  Therapeutic Exercise Time Entry: 10      Insurance Type: Payor: [employer] / Plan: [employer] / Product Type: *No Product type* /     General:  Reason for visit: R thumb  Referred by: Dr. Montilla    Current Problem  1. Laceration of flexor tendon of right thumb              Precautions: per post op protocol - reviewed with patient today       Medical History Form: Reviewed (scanned into chart)    Subjective: I saw the doctor today. I got my stitches out.   Chief Complaint: R thumb  HOOD: moving a metal pool water heater when it slipped and cut his right hand   DOI:  04/13/2024  DOS: 04/19/2024; Repair right thumb FPL tendon, Repair ulnar digital nerve right thumb, Full-thickness skin graft 25 mm x 15 mm right thenar eminence    Hand Dominance: Right    Current Condition since injury:   better      PAIN     Location: volar aspect of thumb  Intensity: 2/10 up to 3/10  Description: sharp, stinging  Aggravating Factors: ROM  Relieving Factors:  Rest     Relevant Information (PMH & Previous Tests/Imaging): reviewed in chart    Prior Level of Function (PLOF)  Work/School:    Current ADL/IADL Status: homemaking     Patients Living Environment: Reviewed and no concern    Primary Language: English    Pt goals for therapy: improve functional use of R thumb for manipulating objects in hand, cooking, cleaning, eating, opening jars, work tasks, and various household chores.    Red Flags: Do you have any of the following? No  Fever/chills, unexplained  weight changes, dizziness/fainting, unexplained change in bowel or bladder functions, unexplained malaise or muscle weakness, night pain/sweats, numbness or tingling    Objective:  Wrist extension/flexion: 50/60  Distance to DPC:  2nd: 3 cm  3rd: 3 cm  4th: 3 cm  5th: 3 cm  Passive digit flexion to DPC  Passive thumb flexion  MCP: 45 was 40  IP: 30 was 20    Physical Observation: sutures intact, minimal bleeding noted, moderate edema in thumb, patient presented without orthosis today    Numbness/Tingling: numbness along ulnar aspect of thumb    Outcome Measures:  Quick DASH: 75    EDUCATION: home exercise program, plan of care, activity modifications, pain management, and injury pathology     Goals:  Active       OT Goals       OT Goal 1       Start:  05/02/24    Expected End:  05/30/24       Patient to report compliance with orthosis wear schedule to protect surgical report and improve functional use of R thumb.         OT Goal 2       Start:  05/02/24    Expected End:  05/30/24       Achieve composite fist for manipulating objects in hand.         OT Goal 3       Start:  05/02/24    Expected End:  06/27/24       Improve Quick DASH to 15%.         OT Goal 4       Start:  05/02/24    Expected End:  06/27/24       Improve active thumb IP flexion to 60 degrees for handwriting.         OT Goal 5       Start:  05/02/24    Expected End:  06/27/24       Report max pain of 2/10 for work tasks.           Plan of care was developed with input and agreement by the patient    Treatments:    Modalities  10 min   Heating pad applied to circumferential hand prior to completion of exercises.    Therapeutic Exercise:   10 min  HEP education and completion : active composite fist, digit flexion stretch, active wrist flexion , passive thumb flexion in orthosis and allowing thumb to extend back into orthosis, short arc gentle thumb flexion. - no bill  Short arc thumb flexion.  Passive IP flexion and MCP flexion.  Short arc  tenodesis.    Manual Therapy:     10 min  Therapist performed manual thumb and digit flexion stretches: MCP flexion and IP flexion.       Assessment:    Continued range of motion exercises today. Passive range of motion improved today. Patient was educated on full time wear of orthosis as patient presented without orthosis to treatment session today. Reviewed short arc flexion and passive thumb flexion. Patient to follow up Thursday.     Plan:      Planned Interventions include: therapeutic exercise, therapeutic activity, self-care home management, manual therapy, therapeutic activities, gait training, neuromuscular coordination, vasopneumatic, dry needling, aquatic therapy, electric stimulation, fluidotherapy, ultrasound, kinesiotaping, orthosis fabrication, wound care  Frequency: 2 x Week  Duration: 8 Weeks      Abdoulaye East, OT

## 2024-05-09 ENCOUNTER — TREATMENT (OUTPATIENT)
Dept: OCCUPATIONAL THERAPY | Facility: HOSPITAL | Age: 39
End: 2024-05-09
Payer: COMMERCIAL

## 2024-05-09 DIAGNOSIS — S56.021A LACERATION OF FLEXOR TENDON OF RIGHT THUMB: ICD-10-CM

## 2024-05-09 PROCEDURE — 97110 THERAPEUTIC EXERCISES: CPT | Mod: GO | Performed by: OCCUPATIONAL THERAPIST

## 2024-05-09 PROCEDURE — 97140 MANUAL THERAPY 1/> REGIONS: CPT | Mod: GO | Performed by: OCCUPATIONAL THERAPIST

## 2024-05-09 NOTE — PROGRESS NOTES
Occupational Therapy  Occupational Therapy Orthopedic Treatment    Patient Name: Bubba Lew  MRN: [id number]  Today's Date: 5/9/2024     Insurance:  Visit number: 3 of 12  Authorization info: approved  Insurance Type: [employer]WC  Time:  Time Calculation  Start Time: 1300  Stop Time: 1335  Time Calculation (min): 35 min  OT Therapeutic Procedures Time Entry  Manual Therapy Time Entry: 15  Therapeutic Exercise Time Entry: 10    Insurance Type: Payor: [employer] / Plan: [employer] / Product Type: *No Product type* /     General:  Reason for visit: R thumb  Referred by: Dr. Montilla    Current Problem  1. Laceration of flexor tendon of right thumb  Follow Up In Occupational Therapy            Precautions: per post op protocol - reviewed with patient today       Medical History Form: Reviewed (scanned into chart)    Subjective: Some of the scabbing came off and I feel like it is easier to move my thumb now.  Chief Complaint: R thumb  HOOD: moving a metal pool water heater when it slipped and cut his right hand   DOI:  04/13/2024  DOS: 04/19/2024; Repair right thumb F[employer] tendon, Repair ulnar digital nerve right thumb, Full-thickness skin graft 25 mm x 15 mm right thenar eminence    Hand Dominance: Right    Current Condition since injury:   better      PAIN     Location: volar aspect of thumb  Intensity: 2/10 up to 3/10  Description: sharp, stinging  Aggravating Factors: ROM  Relieving Factors:  Rest     Relevant Information (PMH & Previous Tests/Imaging): reviewed in chart    Prior Level of Function (PLOF)  Work/School:    Current ADL/IADL Status: homemaking     Patients Living Environment: Reviewed and no concern    Primary Language: English    Pt goals for therapy: improve functional use of R thumb for manipulating objects in hand, cooking, cleaning, eating, opening jars, work tasks, and various household chores.    Red Flags: Do you have any of the following? No  Fever/chills, unexplained  weight changes, dizziness/fainting, unexplained change in bowel or bladder functions, unexplained malaise or muscle weakness, night pain/sweats, numbness or tingling    Objective:  Wrist extension/flexion: 50/60  Distance to DPC:  2nd: 3 cm  3rd: 3 cm  4th: 3 cm  5th: 3 cm  Passive digit flexion to DPC  Passive thumb flexion  MCP: 45   IP: 30   Physical Observation: sutures intact, minimal bleeding noted, moderate edema in thumb,     Numbness/Tingling: numbness along ulnar aspect of thumb    Outcome Measures:  Quick DASH: 75    EDUCATION: home exercise program, plan of care, activity modifications, pain management, and injury pathology     Goals:  Active       OT Goals       OT Goal 1       Start:  05/02/24    Expected End:  05/30/24       Patient to report compliance with orthosis wear schedule to protect surgical report and improve functional use of R thumb.         OT Goal 2       Start:  05/02/24    Expected End:  05/30/24       Achieve composite fist for manipulating objects in hand.         OT Goal 3       Start:  05/02/24    Expected End:  06/27/24       Improve Quick DASH to 15%.         OT Goal 4       Start:  05/02/24    Expected End:  06/27/24       Improve active thumb IP flexion to 60 degrees for handwriting.         OT Goal 5       Start:  05/02/24    Expected End:  06/27/24       Report max pain of 2/10 for work tasks.           Plan of care was developed with input and agreement by the patient    Treatments:    Modalities  10 min   Heating pad applied to circumferential hand prior to completion of exercises.    Therapeutic Exercise:   10 min  HEP education and completion : active composite fist, digit flexion stretch, active wrist flexion , passive thumb flexion in orthosis and allowing thumb to extend back into orthosis, short arc gentle thumb flexion. - no bill  Short arc thumb flexion.  Passive IP flexion and MCP flexion.  Short arc tenodesis.    Manual Therapy:     15 min  Therapist performed  manual thumb and digit flexion stretches: MCP flexion and IP flexion.   Therapist performed manual scar mobilization.      Assessment:    Continued range of motion exercises today. Patient had light serious drainage. Patient doing well overall. Patient presented today with orthosis donned. Patient to follow up next week.      Plan:      Planned Interventions include: therapeutic exercise, therapeutic activity, self-care home management, manual therapy, therapeutic activities, gait training, neuromuscular coordination, vasopneumatic, dry needling, aquatic therapy, electric stimulation, fluidotherapy, ultrasound, kinesiotaping, orthosis fabrication, wound care  Frequency: 2 x Week  Duration: 8 Weeks      Abdoulaye East, OT

## 2024-05-14 ENCOUNTER — TREATMENT (OUTPATIENT)
Dept: OCCUPATIONAL THERAPY | Facility: HOSPITAL | Age: 39
End: 2024-05-14
Payer: COMMERCIAL

## 2024-05-14 DIAGNOSIS — S56.021A LACERATION OF FLEXOR TENDON OF RIGHT THUMB: ICD-10-CM

## 2024-05-14 PROCEDURE — 97110 THERAPEUTIC EXERCISES: CPT | Mod: GO | Performed by: OCCUPATIONAL THERAPIST

## 2024-05-14 PROCEDURE — 97140 MANUAL THERAPY 1/> REGIONS: CPT | Mod: GO | Performed by: OCCUPATIONAL THERAPIST

## 2024-05-14 NOTE — PROGRESS NOTES
Occupational Therapy  Occupational Therapy Orthopedic Treatment    Patient Name: Bubba Lew  MRN: [id number]  Today's Date: 5/14/2024     Insurance:  Visit number: 4 of 12  Authorization info: approved  Insurance Type: [employer]WC  Time:  Time Calculation  Start Time: 1320  Stop Time: 1355  Time Calculation (min): 35 min  OT Therapeutic Procedures Time Entry  Manual Therapy Time Entry: 15  Therapeutic Exercise Time Entry: 10    Insurance Type: Payor: [employer] / Plan: [employer] / Product Type: *No Product type* /     General:  Reason for visit: R thumb  Referred by: Dr. Montilla    Current Problem  1. Laceration of flexor tendon of right thumb  Follow Up In Occupational Therapy          Precautions: per post op protocol - reviewed with patient today       Medical History Form: Reviewed (scanned into chart)    Subjective: It feels pretty good. There are a couple spots that are sensitive on the inside of my thumb.  Chief Complaint: R thumb  HOOD: moving a metal pool water heater when it slipped and cut his right hand   DOI:  04/13/2024  DOS: 04/19/2024; Repair right thumb [employer] tendon, Repair ulnar digital nerve right thumb, Full-thickness skin graft 25 mm x 15 mm right thenar eminence    Hand Dominance: Right    Current Condition since injury:   better      PAIN     Location: volar aspect of thumb  Intensity: 2/10 up to 3/10  Description: sharp, stinging  Aggravating Factors: ROM  Relieving Factors:  Rest     Relevant Information (PMH & Previous Tests/Imaging): reviewed in chart    Prior Level of Function (PLOF)  Work/School:    Current ADL/IADL Status: homemaking     Patients Living Environment: Reviewed and no concern    Primary Language: English    Pt goals for therapy: improve functional use of R thumb for manipulating objects in hand, cooking, cleaning, eating, opening jars, work tasks, and various household chores.    Red Flags: Do you have any of the following? No  Fever/chills,  unexplained weight changes, dizziness/fainting, unexplained change in bowel or bladder functions, unexplained malaise or muscle weakness, night pain/sweats, numbness or tingling    Objective:  Wrist extension/flexion: 50/60  Distance to DPC:  2nd: 3 cm  3rd: 3 cm  4th: 3 cm  5th: 3 cm  Passive digit flexion to DPC  Passive thumb flexion  MCP: 50 was 45   IP: 45 was 30   Physical Observation: sutures intact, minimal bleeding noted, moderate edema in thumb,     Numbness/Tingling: numbness along ulnar aspect of thumb    Outcome Measures:  Quick DASH: 75    EDUCATION: home exercise program, plan of care, activity modifications, pain management, and injury pathology     Goals:  Active       OT Goals       OT Goal 1       Start:  05/02/24    Expected End:  05/30/24       Patient to report compliance with orthosis wear schedule to protect surgical report and improve functional use of R thumb.         OT Goal 2       Start:  05/02/24    Expected End:  05/30/24       Achieve composite fist for manipulating objects in hand.         OT Goal 3       Start:  05/02/24    Expected End:  06/27/24       Improve Quick DASH to 15%.         OT Goal 4       Start:  05/02/24    Expected End:  06/27/24       Improve active thumb IP flexion to 60 degrees for handwriting.         OT Goal 5       Start:  05/02/24    Expected End:  06/27/24       Report max pain of 2/10 for work tasks.           Plan of care was developed with input and agreement by the patient    Treatments:    Modalities  10 min   Heating pad applied to circumferential hand prior to completion of exercises.    Therapeutic Exercise:   10 min  HEP education and completion : active composite fist, digit flexion stretch, active wrist flexion , passive thumb flexion in orthosis and allowing thumb to extend back into orthosis, short arc gentle thumb flexion. - no bill  Short arc thumb flexion.  Passive IP flexion and MCP flexion.  Short arc tenodesis.    Manual Therapy:     15  min  Therapist performed manual thumb and digit flexion stretches: MCP flexion and IP flexion. Passive wrist flexion stretch.  Therapist performed manual scar mobilization.      Assessment:    Continued range of motion exercises today. Patient presented in orthosis today. Graft site continues to heal. Patient to continue working on short arc motion and scar mobilization. Patient reports good compliance with home program.     Plan:      Planned Interventions include: therapeutic exercise, therapeutic activity, self-care home management, manual therapy, therapeutic activities, gait training, neuromuscular coordination, vasopneumatic, dry needling, aquatic therapy, electric stimulation, fluidotherapy, ultrasound, kinesiotaping, orthosis fabrication, wound care  Frequency: 2 x Week  Duration: 8 Weeks      Abdoulaye East, OT

## 2024-05-20 ENCOUNTER — DOCUMENTATION (OUTPATIENT)
Dept: OCCUPATIONAL THERAPY | Facility: HOSPITAL | Age: 39
End: 2024-05-20
Payer: COMMERCIAL

## 2024-05-20 NOTE — PROGRESS NOTES
Occupational Therapy                 Therapy Communication Note    Patient Name: Bubba Lew  MRN: [id number]  Today's Date: 05/16/2024    Discipline: Occupational Therapy    Missed Visit Reason:      Missed Time: No Show    Comment:

## 2024-05-21 ENCOUNTER — TREATMENT (OUTPATIENT)
Dept: OCCUPATIONAL THERAPY | Facility: HOSPITAL | Age: 39
End: 2024-05-21
Payer: COMMERCIAL

## 2024-05-21 DIAGNOSIS — S56.021A LACERATION OF FLEXOR TENDON OF RIGHT THUMB: ICD-10-CM

## 2024-05-21 PROCEDURE — 97110 THERAPEUTIC EXERCISES: CPT | Mod: GO | Performed by: OCCUPATIONAL THERAPIST

## 2024-05-21 PROCEDURE — 97140 MANUAL THERAPY 1/> REGIONS: CPT | Mod: GO | Performed by: OCCUPATIONAL THERAPIST

## 2024-05-21 NOTE — PROGRESS NOTES
Occupational Therapy  Occupational Therapy Orthopedic Treatment    Patient Name: Bubba Lew  MRN: [id number]  Today's Date: 5/21/2024     Insurance:  Visit number: 5 of 12  Authorization info: approved  Insurance Type: [employer]WC  Time:  Time Calculation  Start Time: 1330  Stop Time: 1400  Time Calculation (min): 30 min  OT Therapeutic Procedures Time Entry  Manual Therapy Time Entry: 15  Therapeutic Exercise Time Entry: 10      Insurance Type: Payor: [employer] / Plan: [employer] / Product Type: *No Product type* /       General:  Reason for visit: R thumb  Referred by: Dr. Montilla    Current Problem  1. Laceration of flexor tendon of right thumb  Follow Up In Occupational Therapy          Precautions: per post op protocol - reviewed with patient today       Medical History Form: Reviewed (scanned into chart)    Subjective: I notice the scar tissue in a few spots but it is feeling better.   Chief Complaint: R thumb  HOOD: moving a metal pool water heater when it slipped and cut his right hand   DOI:  04/13/2024  DOS: 04/19/2024; Repair right thumb [employer] tendon, Repair ulnar digital nerve right thumb, Full-thickness skin graft 25 mm x 15 mm right thenar eminence    Hand Dominance: Right    Current Condition since injury:   better      PAIN     Location: volar aspect of thumb  Intensity: 2/10 up to 3/10  Description: sharp, stinging  Aggravating Factors: ROM  Relieving Factors:  Rest     Relevant Information (PMH & Previous Tests/Imaging): reviewed in chart    Prior Level of Function (PLOF)  Work/School:    Current ADL/IADL Status: homemaking     Patients Living Environment: Reviewed and no concern    Primary Language: English    Pt goals for therapy: improve functional use of R thumb for manipulating objects in hand, cooking, cleaning, eating, opening jars, work tasks, and various household chores.    Red Flags: Do you have any of the following? No  Fever/chills, unexplained weight  changes, dizziness/fainting, unexplained change in bowel or bladder functions, unexplained malaise or muscle weakness, night pain/sweats, numbness or tingling    Objective:  Wrist extension/flexion: 50/60  Distance to DPC:  2nd: 1 cm was 3 cm  3rd: 1 cm was 3 cm  4th: 1 cm was 3 cm  5th: 1 cm was  3 cm  Passive digit flexion to DPC  Passive thumb flexion  MCP: 60 was 50     IP: 53 was 45   Physical Observation: sutures intact, minimal bleeding noted, moderate edema in thumb,    Numbness/Tingling: numbness along ulnar aspect of thumb    Outcome Measures:  Quick DASH: 75    EDUCATION: home exercise program, plan of care, activity modifications, pain management, and injury pathology     Goals:  Active       OT Goals       OT Goal 1       Start:  05/02/24    Expected End:  05/30/24       Patient to report compliance with orthosis wear schedule to protect surgical report and improve functional use of R thumb.         OT Goal 2       Start:  05/02/24    Expected End:  05/30/24       Achieve composite fist for manipulating objects in hand.         OT Goal 3       Start:  05/02/24    Expected End:  06/27/24       Improve Quick DASH to 15%.         OT Goal 4       Start:  05/02/24    Expected End:  06/27/24       Improve active thumb IP flexion to 60 degrees for handwriting.         OT Goal 5       Start:  05/02/24    Expected End:  06/27/24       Report max pain of 2/10 for work tasks.           Plan of care was developed with input and agreement by the patient    Treatments:    Modalities  5 min   Heating pad applied to circumferential hand prior to completion of exercises.    Therapeutic Exercise:   10 min  HEP education and completion : active composite fist, digit flexion stretch, active wrist flexion , passive thumb flexion in orthosis and allowing thumb to extend back into orthosis, short arc gentle thumb flexion. - no bill  Short arc thumb flexion.  Passive IP flexion and MCP flexion.  Tenodesis wrist and  radial/ulnar deviation with thumb in passive flexed position.    Manual Therapy:     15 min  Therapist performed manual thumb and digit flexion stretches: MCP flexion and IP flexion. Passive wrist flexion stretch.  Therapist performed manual scar mobilization.      Assessment:    ROM improved today. Skin graft healing well. Patient presented without orthosis today but reports good compliance with wear and with completion of exercises. Patient to follow up Thursday. Session abbreviated due to patient arriving late to appointment. Pain unchanged but remains low.     Plan:      Planned Interventions include: therapeutic exercise, therapeutic activity, self-care home management, manual therapy, therapeutic activities, gait training, neuromuscular coordination, vasopneumatic, dry needling, aquatic therapy, electric stimulation, fluidotherapy, ultrasound, kinesiotaping, orthosis fabrication, wound care  Frequency: 2 x Week  Duration: 8 Weeks      Abdoulaye East OT

## 2024-05-23 ENCOUNTER — TREATMENT (OUTPATIENT)
Dept: OCCUPATIONAL THERAPY | Facility: HOSPITAL | Age: 39
End: 2024-05-23
Payer: COMMERCIAL

## 2024-05-23 DIAGNOSIS — S56.021A LACERATION OF FLEXOR TENDON OF RIGHT THUMB: ICD-10-CM

## 2024-05-23 PROCEDURE — 97140 MANUAL THERAPY 1/> REGIONS: CPT | Mod: GO | Performed by: OCCUPATIONAL THERAPIST

## 2024-05-23 PROCEDURE — 97110 THERAPEUTIC EXERCISES: CPT | Mod: GO | Performed by: OCCUPATIONAL THERAPIST

## 2024-05-23 NOTE — PROGRESS NOTES
Occupational Therapy  Occupational Therapy Orthopedic Treatment    Patient Name: Bubba Lew  MRN: [id number]  Today's Date: 5/23/2024     Insurance:  Visit number: 6 of 12  Authorization info: approved  Insurance Type: [facility]  Time:       Time:  Time Calculation  Start Time: 1245  Stop Time: 1325  Time Calculation (min): 40 min  OT Therapeutic Procedures Time Entry  Manual Therapy Time Entry: 15  Therapeutic Exercise Time Entry: 15      Insurance Type: Payor: [employer] / Plan: [employer] / Product Type: *No Product type* /       General:  Reason for visit: R thumb  Referred by: Dr. Montilla    Current Problem  1. Laceration of flexor tendon of right thumb  Follow Up In Occupational Therapy          Precautions: per post op protocol - reviewed with patient today       Medical History Form: Reviewed (scanned into chart)    Subjective: I feel it loosening up. I still have trouble bending the tip of my thumb.  Chief Complaint: R thumb  HOOD: moving a metal pool water heater when it slipped and cut his right hand   DOI:  04/13/2024  DOS: 04/19/2024; Repair right thumb [employer] tendon, Repair ulnar digital nerve right thumb, Full-thickness skin graft 25 mm x 15 mm right thenar eminence    Hand Dominance: Right    Current Condition since injury:   better      PAIN     Location: volar aspect of thumb  Intensity: 2/10 up to 3/10  Description: sharp, stinging  Aggravating Factors: ROM  Relieving Factors:  Rest     Relevant Information (PMH & Previous Tests/Imaging): reviewed in chart    Prior Level of Function (PLOF)  Work/School:    Current ADL/IADL Status: homemaking     Patients Living Environment: Reviewed and no concern    Primary Language: English    Pt goals for therapy: improve functional use of R thumb for manipulating objects in hand, cooking, cleaning, eating, opening jars, work tasks, and various household chores.    Red Flags: Do you have any of the following? No  Fever/chills,  unexplained weight changes, dizziness/fainting, unexplained change in bowel or bladder functions, unexplained malaise or muscle weakness, night pain/sweats, numbness or tingling    Objective:  Wrist extension/flexion: 50/60  Distance to DPC:  2nd: 1 cm  3rd: 1 cm    4th: 1 cm   5th: 1 cm    Passive digit flexion to DPC  Passive thumb flexion  MCP: 60     IP: 53    Physical Observation: sutures intact, minimal bleeding noted, moderate edema in thumb,    Numbness/Tingling: numbness along ulnar aspect of thumb    Outcome Measures:  Quick DASH: 75    EDUCATION: home exercise program, plan of care, activity modifications, pain management, and injury pathology     Goals:  Active       OT Goals       OT Goal 1       Start:  05/02/24    Expected End:  05/30/24       Patient to report compliance with orthosis wear schedule to protect surgical report and improve functional use of R thumb.         OT Goal 2       Start:  05/02/24    Expected End:  05/30/24       Achieve composite fist for manipulating objects in hand.         OT Goal 3       Start:  05/02/24    Expected End:  06/27/24       Improve Quick DASH to 15%.         OT Goal 4       Start:  05/02/24    Expected End:  06/27/24       Improve active thumb IP flexion to 60 degrees for handwriting.         OT Goal 5       Start:  05/02/24    Expected End:  06/27/24       Report max pain of 2/10 for work tasks.           Plan of care was developed with input and agreement by the patient    Treatments:    Modalities  10 min   Heating pad applied to circumferential hand prior to completion of exercises.    Therapeutic Exercise:   15 min  HEP education and completion : active composite fist, digit flexion stretch, active wrist flexion , passive thumb flexion in orthosis and allowing thumb to extend back into orthosis, short arc gentle thumb flexion. - no bill  Short arc thumb flexion.  Passive IP flexion and MCP flexion.  Tenodesis wrist and radial/ulnar deviation with thumb in  passive flexed position.  AAROM wrist flexion and extension on ball.      Manual Therapy:     15 min  Therapist performed manual thumb and digit flexion stretches: MCP flexion and IP flexion. Passive wrist flexion stretch.  Therapist performed manual scar mobilization.      Assessment:     Continued scar mobilization and range of motion exercises. Patient presented without orthosis today. Patient doing well overall. Patient reports good compliance with home program.      Plan:      Planned Interventions include: therapeutic exercise, therapeutic activity, self-care home management, manual therapy, therapeutic activities, gait training, neuromuscular coordination, vasopneumatic, dry needling, aquatic therapy, electric stimulation, fluidotherapy, ultrasound, kinesiotaping, orthosis fabrication, wound care  Frequency: 2 x Week  Duration: 8 Weeks      Abdoulaye East, OT

## 2024-05-28 ENCOUNTER — TREATMENT (OUTPATIENT)
Dept: OCCUPATIONAL THERAPY | Facility: HOSPITAL | Age: 39
End: 2024-05-28
Payer: COMMERCIAL

## 2024-05-28 DIAGNOSIS — S56.021A LACERATION OF FLEXOR TENDON OF RIGHT THUMB: ICD-10-CM

## 2024-05-28 PROCEDURE — 97110 THERAPEUTIC EXERCISES: CPT | Mod: GO | Performed by: OCCUPATIONAL THERAPIST

## 2024-05-28 PROCEDURE — 97140 MANUAL THERAPY 1/> REGIONS: CPT | Mod: GO | Performed by: OCCUPATIONAL THERAPIST

## 2024-05-28 NOTE — PROGRESS NOTES
Occupational Therapy  Occupational Therapy Orthopedic Treatment    Patient Name: Bubba Lew  MRN: [id number]  Today's Date: 5/28/2024     Insurance:  Visit number: 7 of 12  Authorization info: approved  Insurance Type: BW  Time:  Time Calculation  Start Time: 1315  Stop Time: 1355  Time Calculation (min): 40 min  OT Therapeutic Procedures Time Entry  Manual Therapy Time Entry: 15  Therapeutic Exercise Time Entry: 15      Insurance Type: Payor: [employer] / Plan: [employer] / Product Type: *No Product type* /         Insurance Type: Payor: [employer] / Plan: [employer] / Product Type: *No Product type* /       General:  Reason for visit: R thumb  Referred by: Dr. Montilla    Current Problem  1. Laceration of flexor tendon of right thumb  Follow Up In Occupational Therapy          Precautions: per post op protocol - reviewed with patient today       Medical History Form: Reviewed (scanned into chart)    Subjective: I feel it loosening up. I still have trouble bending the tip of my thumb.  Chief Complaint: R thumb  HOOD: moving a metal pool water heater when it slipped and cut his right hand   DOI:  04/13/2024  DOS: 04/19/2024; Repair right thumb FPL tendon, Repair ulnar digital nerve right thumb, Full-thickness skin graft 25 mm x 15 mm right thenar eminence    Hand Dominance: Right    Current Condition since injury:   better      PAIN     Location: volar aspect of thumb  Intensity: 0/10 was 2/10 up to 3/10  Description: sharp, stinging  Aggravating Factors: ROM  Relieving Factors:  Rest     Relevant Information (PMH & Previous Tests/Imaging): reviewed in chart    Prior Level of Function (PLOF)  Work/School:    Current ADL/IADL Status: homemaking     Patients Living Environment: Reviewed and no concern    Primary Language: English    Pt goals for therapy: improve functional use of R thumb for manipulating objects in hand, cooking, cleaning, eating, opening  jars, work tasks, and various household chores.    Red Flags: Do you have any of the following? No  Fever/chills, unexplained weight changes, dizziness/fainting, unexplained change in bowel or bladder functions, unexplained malaise or muscle weakness, night pain/sweats, numbness or tingling    Objective:  Wrist extension/flexion: 50/60  Distance to DPC:  2nd: 1 cm  3rd: 1 cm    4th: 1 cm   5th: 1 cm    Passive digit flexion to DPC  Active /Passive thumb flexion  MCP: 40 /60     IP: 5/53    Physical Observation: sutures intact, minimal bleeding noted, moderate edema in thumb,    Numbness/Tingling: numbness along ulnar aspect of thumb    Outcome Measures:  Quick DASH: 75    EDUCATION: home exercise program, plan of care, activity modifications, pain management, and injury pathology     Goals:  Active       OT Goals       OT Goal 1       Start:  05/02/24    Expected End:  05/30/24       Patient to report compliance with orthosis wear schedule to protect surgical report and improve functional use of R thumb.         OT Goal 2       Start:  05/02/24    Expected End:  05/30/24       Achieve composite fist for manipulating objects in hand.         OT Goal 3       Start:  05/02/24    Expected End:  06/27/24       Improve Quick DASH to 15%.         OT Goal 4       Start:  05/02/24    Expected End:  06/27/24       Improve active thumb IP flexion to 60 degrees for handwriting.         OT Goal 5       Start:  05/02/24    Expected End:  06/27/24       Report max pain of 2/10 for work tasks.           Plan of care was developed with input and agreement by the patient    Treatments:    Modalities  10 min   Heating pad applied to circumferential hand prior to completion of exercises.    Therapeutic Exercise:   15 min  HEP education and completion : active composite fist, digit flexion stretch, active wrist flexion , passive thumb flexion in orthosis and allowing thumb to extend back into orthosis, short arc gentle thumb flexion. -  no bill  Passive IP flexion and MCP flexion.  Tenodesis wrist and radial/ulnar deviation with thumb in passive flexed position.  AAROM wrist flexion and extension on ball.  Active thumb IP flexion and composite flexion.   Sponge pickup with in hand manipulation and transfer.      Manual Therapy:     15 min  Therapist performed manual thumb and digit flexion stretches: MCP flexion and IP flexion. Passive wrist flexion stretch.  Therapist performed manual scar mobilization.      Assessment:    Progressed exercises today. Patient to continue range of motion and scar mobilization. Pain improved. Patient reports good compliance with home program.     Plan:      Planned Interventions include: therapeutic exercise, therapeutic activity, self-care home management, manual therapy, therapeutic activities, gait training, neuromuscular coordination, vasopneumatic, dry needling, aquatic therapy, electric stimulation, fluidotherapy, ultrasound, kinesiotaping, orthosis fabrication, wound care  Frequency: 2 x Week  Duration: 8 Weeks      Abdoulaye East, OT

## 2024-05-30 ENCOUNTER — DOCUMENTATION (OUTPATIENT)
Dept: OCCUPATIONAL THERAPY | Facility: HOSPITAL | Age: 39
End: 2024-05-30
Payer: COMMERCIAL

## 2024-05-30 NOTE — PROGRESS NOTES
Occupational Therapy                 Therapy Communication Note    Patient Name: Bubba Lew  MRN: [id number]  Today's Date: 5/30/2024     Discipline: Occupational Therapy    Missed Visit Reason:      Missed Time: No Show    Comment:

## 2024-06-03 NOTE — PROGRESS NOTES
[facility]  Hand and Upper Extremity Service  Follow up visit         Follow up for: Right thumb     Interval History: Underwent tendon repair of right thumb on 4/19 for a laceration of flexor tendon.  Has been doing his therapy diligently.  Concerned that he is not getting a lot of improvement in his thumb IP joint range of motion              Past medical history, medications, allergies, surgical history and review of systems are reviewed and otherwise unchanged when compared to last visit on 5/7/24         Examination:  Constitutional: Oriented to person, place, and time.  Appears well-developed and well-nourished.  Head: Normocephalic and atraumatic.  Eyes: Pupils are equal, round, and reactive to light.  Cardiovascular: Intact distal pulses.  Pulmonary/Chest/Breast: Effort normal. No respiratory distress.  Neurological: Alert and oriented to person, place, and time.  Skin: Skin is warm and dry.  Psychiatric: normal mood and affect.  Behavior is normal.  Musculoskeletal: Right hand reveals healed surgical incision including incorporation of the full-thickness skin graft at the MP joint flexion crease.  He has subcutaneous thickening and scarring along the flexor tendon sheath.  Thumb tip is well-perfused.  Persistent diminished sensation ulnar aspect of the thumb.  He has excellent passive thumb IP joint range of motion but very limited active IP joint range of motion.      Personal Interpretation of Diagnostic studies:        Impression: Right thumb laceration, tendon and nerve injury       Plan: Will have him continue with his therapy.  Now that he is 6 weeks from surgery he and his therapist can get a little bit more aggressive in attempting to break up the adhesions at the repair site and underneath the skin graft.  He will return to see me in 6 weeks for repeat clinical examination.  If not making any significant improvement in his active thumb IP joint range of motion we  may have to consider tenolysis procedure.             Follow up: 6 weeks             Yves Montilla MD  [facility]  Department of Orthopaedic Surgery  Hand and Upper Extremity Reconstruction    Scribe Attestation  By signing my name below, I, Leslie Kruger   attest that this documentation has been prepared under the direction and in the presence of Dr. Yves Montilla.    Dictation performed with the use of voice recognition software.  Syntax and grammatical errors may exist.

## 2024-06-04 ENCOUNTER — APPOINTMENT (OUTPATIENT)
Dept: OCCUPATIONAL THERAPY | Facility: HOSPITAL | Age: 39
End: 2024-06-04
Payer: COMMERCIAL

## 2024-06-04 ENCOUNTER — OFFICE VISIT (OUTPATIENT)
Dept: ORTHOPEDIC SURGERY | Facility: CLINIC | Age: 39
End: 2024-06-04
Payer: COMMERCIAL

## 2024-06-04 ENCOUNTER — TREATMENT (OUTPATIENT)
Dept: OCCUPATIONAL THERAPY | Facility: HOSPITAL | Age: 39
End: 2024-06-04
Payer: COMMERCIAL

## 2024-06-04 VITALS — HEIGHT: 67 IN | BODY MASS INDEX: 24.64 KG/M2 | WEIGHT: 157 LBS

## 2024-06-04 DIAGNOSIS — S56.021A LACERATION OF FLEXOR TENDON OF RIGHT THUMB: ICD-10-CM

## 2024-06-04 DIAGNOSIS — S56.021A LACERATION OF FLEXOR TENDON OF RIGHT THUMB: Primary | ICD-10-CM

## 2024-06-04 PROCEDURE — 97140 MANUAL THERAPY 1/> REGIONS: CPT | Mod: GO | Performed by: OCCUPATIONAL THERAPIST

## 2024-06-04 PROCEDURE — 97110 THERAPEUTIC EXERCISES: CPT | Mod: GO | Performed by: OCCUPATIONAL THERAPIST

## 2024-06-04 NOTE — PROGRESS NOTES
Occupational Therapy  Occupational Therapy Orthopedic Treatment    Patient Name: Bubba Lew  MRN: [id number]  Today's Date: 6/4/2024     Insurance:  Visit number: 8 of 12  Authorization info: approved  Insurance Type: [facility]  Time:     Time:  Time Calculation  Start Time: 1225  Stop Time: 1315  Time Calculation (min): 50 min  OT Therapeutic Procedures Time Entry  Manual Therapy Time Entry: 15  Therapeutic Exercise Time Entry: 25      Insurance Type: Payor: [employer] / Plan: [employer] / Product Type: *No Product type* /       General:  Reason for visit: R thumb  Referred by: Dr. Montilla    Current Problem  1. Laceration of flexor tendon of right thumb  Follow Up In Occupational Therapy          Precautions: per post op protocol - reviewed with patient today       Medical History Form: Reviewed (scanned into chart)    Subjective: It still feels tight. I think it is a little better than before.  Chief Complaint: R thumb  HOOD: moving a metal pool water heater when it slipped and cut his right hand   DOI:  04/13/2024  DOS: 04/19/2024; Repair right thumb F[employer] tendon, Repair ulnar digital nerve right thumb, Full-thickness skin graft 25 mm x 15 mm right thenar eminence    Hand Dominance: Right    Current Condition since injury:   better      PAIN     Location: volar aspect of thumb  Intensity: 0/10 was 2/10 up to 3/10  Description: sharp, stinging  Aggravating Factors: ROM  Relieving Factors:  Rest     Relevant Information (PMH & Previous Tests/Imaging): reviewed in chart    Prior Level of Function (PLOF)  Work/School:    Current ADL/IADL Status: homemaking     Patients Living Environment: Reviewed and no concern    Primary Language: English    Pt goals for therapy: improve functional use of R thumb for manipulating objects in hand, cooking, cleaning, eating, opening jars, work tasks, and various household chores.    Red Flags: Do you have any of the following? No  Fever/chills,  unexplained weight changes, dizziness/fainting, unexplained change in bowel or bladder functions, unexplained malaise or muscle weakness, night pain/sweats, numbness or tingling    Objective:  Wrist extension/flexion: 50/60  Distance to DPC:  2nd: 1 cm  3rd: 1 cm    4th: 1 cm   5th: 1 cm    Passive digit flexion to DPC  Active /Passive thumb flexion  MCP: 40 /60     IP: 5/53    Physical Observation: sutures intact, minimal bleeding noted, moderate edema in thumb,    Numbness/Tingling: numbness along ulnar aspect of thumb    Outcome Measures:  Quick DASH: 75    EDUCATION: home exercise program, plan of care, activity modifications, pain management, and injury pathology     Goals:  Active       OT Goals       OT Goal 1       Start:  05/02/24    Expected End:  05/30/24       Patient to report compliance with orthosis wear schedule to protect surgical report and improve functional use of R thumb.         OT Goal 2       Start:  05/02/24    Expected End:  05/30/24       Achieve composite fist for manipulating objects in hand.         OT Goal 3       Start:  05/02/24    Expected End:  06/27/24       Improve Quick DASH to 15%.         OT Goal 4       Start:  05/02/24    Expected End:  06/27/24       Improve active thumb IP flexion to 60 degrees for handwriting.         OT Goal 5       Start:  05/02/24    Expected End:  06/27/24       Report max pain of 2/10 for work tasks.           Plan of care was developed with input and agreement by the patient    Treatments:    Modalities  10 min   Heating pad applied to circumferential hand prior to completion of exercises.    Therapeutic Exercise:   25 min  HEP education and completion : active composite fist, digit flexion stretch, active wrist flexion , passive thumb flexion in orthosis and allowing thumb to extend back into orthosis, short arc gentle thumb flexion. - no bill  Passive IP flexion and MCP flexion.  Place and hold thumb flexion.  Thumb IP blocking.  Thumb MCP  blocking.  Active thumb flexion holds.  Tenodesis wrist and radial/ulnar deviation with thumb in passive flexed position.  AAROM wrist flexion and extension on ball.  Active thumb IP flexion and composite flexion.   Sponge pickup with in hand manipulation and transfer.      Manual Therapy:     15 min  Therapist performed manual thumb and digit flexion stretches: MCP flexion and IP flexion. Passive wrist flexion stretch.  Therapist performed manual scar mobilization.      Assessment:    Incision site closed today. Patient continues to have significant limitations with FPL glide. Therapist fabricated elastomer pad for night use. Patient to work on improve FPL glide and to aggressively work on scar mobilization. Patient to follow up Thursday.     Plan:      Planned Interventions include: therapeutic exercise, therapeutic activity, self-care home management, manual therapy, therapeutic activities, gait training, neuromuscular coordination, vasopneumatic, dry needling, aquatic therapy, electric stimulation, fluidotherapy, ultrasound, kinesiotaping, orthosis fabrication, wound care  Frequency: 2 x Week  Duration: 8 Weeks      Abdoulaye East OT

## 2024-06-06 ENCOUNTER — TREATMENT (OUTPATIENT)
Dept: OCCUPATIONAL THERAPY | Facility: HOSPITAL | Age: 39
End: 2024-06-06
Payer: COMMERCIAL

## 2024-06-06 DIAGNOSIS — S56.021A LACERATION OF FLEXOR TENDON OF RIGHT THUMB: ICD-10-CM

## 2024-06-06 PROCEDURE — 97140 MANUAL THERAPY 1/> REGIONS: CPT | Mod: GO | Performed by: OCCUPATIONAL THERAPIST

## 2024-06-06 PROCEDURE — 97110 THERAPEUTIC EXERCISES: CPT | Mod: GO | Performed by: OCCUPATIONAL THERAPIST

## 2024-06-06 NOTE — PROGRESS NOTES
Occupational Therapy  Occupational Therapy Orthopedic Treatment    Patient Name: Bubba Lew  MRN: [id number]  Today's Date: 6/6/2024     Insurance:  Visit number: 9 of 12  Authorization info: approved  Insurance Type: [employer]  Time:  Time Calculation  Start Time: 1253  Stop Time: 1331  Time Calculation (min): 38 min  OT Therapeutic Procedures Time Entry  Manual Therapy Time Entry: 10  Therapeutic Exercise Time Entry: 20        Insurance Type: Payor: [employer] / Plan: [employer] / Product Type: *No Product type* /       General:  Reason for visit: R thumb  Referred by: Dr. Montilla    Current Problem  1. Laceration of flexor tendon of right thumb  Follow Up In Occupational Therapy          Precautions: per post op protocol - reviewed with patient today       Medical History Form: Reviewed (scanned into chart)    Subjective: I think it is moving a little better.  Chief Complaint: R thumb  HOOD: moving a metal pool water heater when it slipped and cut his right hand   DOI:  04/13/2024  DOS: 04/19/2024; Repair right thumb FPL tendon, Repair ulnar digital nerve right thumb, Full-thickness skin graft 25 mm x 15 mm right thenar eminence    Hand Dominance: Right    Current Condition since injury:   better      PAIN     Location: volar aspect of thumb  Intensity: 0/10  up to 3/10  Description: sharp, stinging  Aggravating Factors: ROM  Relieving Factors:  Rest     Relevant Information (PMH & Previous Tests/Imaging): reviewed in chart    Prior Level of Function (PLOF)  Work/School:    Current ADL/IADL Status: homemaking     Patients Living Environment: Reviewed and no concern    Primary Language: English    Pt goals for therapy: improve functional use of R thumb for manipulating objects in hand, cooking, cleaning, eating, opening jars, work tasks, and various household chores.    Red Flags: Do you have any of the following? No  Fever/chills, unexplained weight changes, dizziness/fainting,  unexplained change in bowel or bladder functions, unexplained malaise or muscle weakness, night pain/sweats, numbness or tingling    Objective:  Wrist extension/flexion: 50/60  Distance to DPC:  2nd: 1 cm  3rd: 1 cm    4th: 1 cm   5th: 1 cm    Passive digit flexion to DPC  Active /Passive thumb flexion  MCP: 40 /65 was 60     IP: 5/63 was 53    Physical Observation: sutures intact, minimal bleeding noted, moderate edema in thumb,    Numbness/Tingling: numbness along ulnar aspect of thumb    Outcome Measures:  Quick DASH: 75    EDUCATION: home exercise program, plan of care, activity modifications, pain management, and injury pathology     Goals:  Active       OT Goals       OT Goal 1       Start:  05/02/24    Expected End:  05/30/24       Patient to report compliance with orthosis wear schedule to protect surgical report and improve functional use of R thumb.         OT Goal 2       Start:  05/02/24    Expected End:  05/30/24       Achieve composite fist for manipulating objects in hand.         OT Goal 3       Start:  05/02/24    Expected End:  06/27/24       Improve Quick DASH to 15%.         OT Goal 4       Start:  05/02/24    Expected End:  06/27/24       Improve active thumb IP flexion to 60 degrees for handwriting.         OT Goal 5       Start:  05/02/24    Expected End:  06/27/24       Report max pain of 2/10 for work tasks.           Plan of care was developed with input and agreement by the patient    Treatments:    Modalities  8 min   Heating pad applied to circumferential hand prior to completion of exercises.    Therapeutic Exercise:   20 min  HEP education and completion : active composite fist, digit flexion stretch, active wrist flexion , passive thumb flexion in orthosis and allowing thumb to extend back into orthosis, short arc gentle thumb flexion. - no bill  Passive IP flexion and MCP flexion.  Place and hold thumb flexion.  Thumb IP blocking.  Thumb MCP blocking.  Active thumb flexion  holds.  Tenodesis wrist and radial/ulnar deviation with thumb in passive flexed position.  AAROM wrist flexion and extension on ball.  Active thumb IP flexion and composite flexion.   Thumb slides on ball.  Active CMC opposition.   Therapist assisted with place and hold.      Manual Therapy:     10 min  Therapist performed manual thumb and digit flexion stretches: MCP flexion and IP flexion. Passive wrist flexion stretch.  Therapist performed manual scar mobilization.      Assessment:    Passive motion improved. Continued aggressively working on scar mobilization. Patient to follow up next week.     Plan:      Planned Interventions include: therapeutic exercise, therapeutic activity, self-care home management, manual therapy, therapeutic activities, gait training, neuromuscular coordination, vasopneumatic, dry needling, aquatic therapy, electric stimulation, fluidotherapy, ultrasound, kinesiotaping, orthosis fabrication, wound care  Frequency: 2 x Week  Duration: 8 Weeks      Abdoulaye East OT

## 2024-06-10 ENCOUNTER — DOCUMENTATION (OUTPATIENT)
Dept: OCCUPATIONAL THERAPY | Facility: HOSPITAL | Age: 39
End: 2024-06-10
Payer: COMMERCIAL

## 2024-06-10 NOTE — PROGRESS NOTES
No Occupational Therapy                 Therapy Communication Note    Patient Name: Bubba Lew  MRN: [id number]  Today's Date: 6/10/2024     Discipline: Occupational Therapy    Missed Visit Reason:      Missed Time: No Show    Comment:

## 2024-06-12 ENCOUNTER — TREATMENT (OUTPATIENT)
Dept: OCCUPATIONAL THERAPY | Facility: HOSPITAL | Age: 39
End: 2024-06-12
Payer: COMMERCIAL

## 2024-06-12 DIAGNOSIS — S56.021A LACERATION OF FLEXOR TENDON OF RIGHT THUMB: ICD-10-CM

## 2024-06-12 PROCEDURE — 97140 MANUAL THERAPY 1/> REGIONS: CPT | Mod: GO | Performed by: OCCUPATIONAL THERAPIST

## 2024-06-12 PROCEDURE — 97110 THERAPEUTIC EXERCISES: CPT | Mod: GO | Performed by: OCCUPATIONAL THERAPIST

## 2024-06-12 PROCEDURE — 97022 WHIRLPOOL THERAPY: CPT | Mod: GO | Performed by: OCCUPATIONAL THERAPIST

## 2024-06-12 NOTE — PROGRESS NOTES
Occupational Therapy  Occupational Therapy Orthopedic Treatment    Patient Name: Bubba Lew  MRN: [id number]  Today's Date: 6/12/2024     Insurance:  Visit number: 10 of 12  Authorization info: approved  Insurance Type: [employer]  Time:   Time:  Time Calculation  Start Time: 1230  Stop Time: 1315  Time Calculation (min): 45 min  OT Modalities Time Entry  [place] Time Entry: 10  OT Therapeutic Procedures Time Entry  Manual Therapy Time Entry: 15  Therapeutic Exercise Time Entry: 20      Insurance Type: Payor: [employer] / Plan: [employer] / Product Type: *No Product type* /     General:  Reason for visit: R thumb  Referred by: Dr. Montilla    Current Problem  1. Laceration of flexor tendon of right thumb  Follow Up In Occupational Therapy        Precautions: per post op protocol - reviewed with patient today     Medical History Form: Reviewed (scanned into chart)    Subjective: I think it is moving a little better.  Chief Complaint: R thumb  HOOD: moving a metal pool water heater when it slipped and cut his right hand   DOI:  04/13/2024  DOS: 04/19/2024; Repair right thumb FPL tendon, Repair ulnar digital nerve right thumb, Full-thickness skin graft 25 mm x 15 mm right thenar eminence    Hand Dominance: Right    Current Condition since injury:   better      PAIN     Location: volar aspect of thumb  Intensity: 0/10  up to 3/10  Description: sharp, stinging  Aggravating Factors: ROM  Relieving Factors:  Rest     Relevant Information (PMH & Previous Tests/Imaging): reviewed in chart    Prior Level of Function (PLOF)  Work/School:    Current ADL/IADL Status: homemaking     Patients Living Environment: Reviewed and no concern    Primary Language: English    Pt goals for therapy: improve functional use of R thumb for manipulating objects in hand, cooking, cleaning, eating, opening jars, work tasks, and various household chores.    Red Flags: Do you have any of the following?  No  Fever/chills, unexplained weight changes, dizziness/fainting, unexplained change in bowel or bladder functions, unexplained malaise or muscle weakness, night pain/sweats, numbness or tingling    Objective:  Wrist extension/flexion: 50/60  Distance to DPC:  2nd: 0 was 1 cm  3rd: 0 was 1 cm    4th: 0 was 1 cm   5th: 0 was 1 cm    Passive digit flexion to DPC  Active /Passive thumb flexion  MCP: 50 was 40 /65 was 60     IP: 5/63    Physical Observation: moderate edema in thumb,    Numbness/Tingling: numbness along ulnar aspect of thumb    Outcome Measures:  Quick DASH: 75    EDUCATION: home exercise program, plan of care, activity modifications, pain management, and injury pathology     Goals:  Active       OT Goals       OT Goal 1       Start:  05/02/24    Expected End:  05/30/24       Patient to report compliance with orthosis wear schedule to protect surgical report and improve functional use of R thumb.         OT Goal 2       Start:  05/02/24    Expected End:  05/30/24       Achieve composite fist for manipulating objects in hand.         OT Goal 3       Start:  05/02/24    Expected End:  06/27/24       Improve Quick DASH to 15%.         OT Goal 4       Start:  05/02/24    Expected End:  06/27/24       Improve active thumb IP flexion to 60 degrees for handwriting.         OT Goal 5       Start:  05/02/24    Expected End:  06/27/24       Report max pain of 2/10 for work tasks.           Plan of care was developed with input and agreement by the patient    Treatments:    Modalities  10 min  Fluidotherapy to promote thumb range of motion.    Therapeutic Exercise:   20 min  HEP education and completion : active composite fist, digit flexion stretch, active wrist flexion , passive thumb flexion in orthosis and allowing thumb to extend back into orthosis, short arc gentle thumb flexion. - no bill  Passive IP flexion and MCP flexion.  Place and hold thumb flexion.  Thumb IP blocking.  Thumb MCP blocking.  Active  thumb flexion holds.  Tenodesis wrist and radial/ulnar deviation with thumb in passive flexed position.  AAROM wrist flexion and extension on ball.  Active thumb IP flexion and composite flexion.   Thumb slides on ball.  Active CMC opposition.   Therapist assisted with place and hold.  Sponge pickup with in hand manipulation and transfer.      Manual Therapy:     15 min  Therapist performed manual thumb and digit flexion stretches: MCP flexion and IP flexion. Passive wrist flexion stretch.  Therapist performed manual scar mobilization.      Assessment:    Active MCP flexion improved. Patient was issued silicone gel pad for night use. Patient reports good compliance with home program. Patient to follow up next week. IP motion continues to be significantly limited.     Plan:      Planned Interventions include: therapeutic exercise, therapeutic activity, self-care home management, manual therapy, therapeutic activities, gait training, neuromuscular coordination, vasopneumatic, dry needling, aquatic therapy, electric stimulation, fluidotherapy, ultrasound, kinesiotaping, orthosis fabrication, wound care  Frequency: 2 x Week  Duration: 8 Weeks      Abdoulaye East, OT

## 2024-06-18 ENCOUNTER — TREATMENT (OUTPATIENT)
Dept: OCCUPATIONAL THERAPY | Facility: HOSPITAL | Age: 39
End: 2024-06-18
Payer: COMMERCIAL

## 2024-06-18 DIAGNOSIS — S56.021A LACERATION OF FLEXOR TENDON OF RIGHT THUMB: Primary | ICD-10-CM

## 2024-06-18 PROCEDURE — 97140 MANUAL THERAPY 1/> REGIONS: CPT | Mod: GO | Performed by: OCCUPATIONAL THERAPIST

## 2024-06-18 PROCEDURE — 97110 THERAPEUTIC EXERCISES: CPT | Mod: GO | Performed by: OCCUPATIONAL THERAPIST

## 2024-06-18 NOTE — PROGRESS NOTES
Occupational Therapy  Occupational Therapy Orthopedic Treatment    Patient Name: Bubba Lew  MRN: [id number]  Today's Date: 6/18/2024     Insurance:  Visit number: 11 of 12  Authorization info: approved  Insurance Type: BWC  Time:  Time Calculation  Start Time: 1530  Stop Time: 1610  Time Calculation (min): 40 min  OT Therapeutic Procedures Time Entry  Manual Therapy Time Entry: 15  Therapeutic Exercise Time Entry: 20        Insurance Type: Payor: [employer] / Plan: [employer] / Product Type: *No Product type* /     General:  Reason for visit: R thumb  Referred by: Dr. Montilla    Current Problem  1. Laceration of flexor tendon of right thumb            Precautions: per post op protocol - reviewed with patient today     Medical History Form: Reviewed (scanned into chart)    Subjective: I have been really working at it. It is slightly better.  Chief Complaint: R thumb  HOOD: moving a metal pool water heater when it slipped and cut his right hand   DOI:  04/13/2024  DOS: 04/19/2024; Repair right thumb FPL tendon, Repair ulnar digital nerve right thumb, Full-thickness skin graft 25 mm x 15 mm right thenar eminence    Hand Dominance: Right    Current Condition since injury:   better      PAIN     Location: volar aspect of thumb  Intensity: 0/10  up to 3/10  Description: sharp, stinging  Aggravating Factors: ROM  Relieving Factors:  Rest     Relevant Information (PMH & Previous Tests/Imaging): reviewed in chart    Prior Level of Function (PLOF)  Work/School:    Current ADL/IADL Status: homemaking     Patients Living Environment: Reviewed and no concern    Primary Language: English    Pt goals for therapy: improve functional use of R thumb for manipulating objects in hand, cooking, cleaning, eating, opening jars, work tasks, and various household chores.    Red Flags: Do you have any of the following? No  Fever/chills, unexplained weight changes, dizziness/fainting, unexplained change  in bowel or bladder functions, unexplained malaise or muscle weakness, night pain/sweats, numbness or tingling    Objective:  Wrist extension/flexion: 50/60  Distance to DPC:  2nd: 0 \  3rd: 0 was 1 cm    4th: 0 was 1 cm   5th: 0 was 1 cm    Passive digit flexion to DPC  Active /Passive thumb flexion  MCP: 50 was 40 /65 was 60     IP: 5/63    Physical Observation: moderate edema in thumb,    Numbness/Tingling: numbness along ulnar aspect of thumb    Outcome Measures:  Quick DASH: 75    EDUCATION: home exercise program, plan of care, activity modifications, pain management, and injury pathology     Goals:  Active       OT Goals       OT Goal 1       Start:  05/02/24    Expected End:  05/30/24       Patient to report compliance with orthosis wear schedule to protect surgical report and improve functional use of R thumb.         OT Goal 2       Start:  05/02/24    Expected End:  05/30/24       Achieve composite fist for manipulating objects in hand.         OT Goal 3       Start:  05/02/24    Expected End:  06/27/24       Improve Quick DASH to 15%.         OT Goal 4       Start:  05/02/24    Expected End:  06/27/24       Improve active thumb IP flexion to 60 degrees for handwriting.         OT Goal 5       Start:  05/02/24    Expected End:  06/27/24       Report max pain of 2/10 for work tasks.           Plan of care was developed with input and agreement by the patient    Treatments:    Modalities  10 min  Heating pad applied to circumferential hand.    Therapeutic Exercise:   20 min  HEP education and completion : active composite fist, digit flexion stretch, active wrist flexion , passive thumb flexion in orthosis and allowing thumb to extend back into orthosis, short arc gentle thumb flexion. - no bill  Passive IP flexion and MCP flexion.  Place and hold thumb flexion.  Thumb IP blocking.  Thumb MCP blocking.  Active thumb flexion holds.  Tenodesis wrist and radial/ulnar deviation with thumb in passive flexed  position.  AAROM wrist flexion and extension on ball.  Active thumb IP flexion and composite flexion.   Thumb slides on ball.  Active CMC opposition.   Therapist assisted with place and hold.  Bead pickup with in hand manipulation and transfer.      Manual Therapy:     15 min  Therapist performed manual thumb and digit flexion stretches: MCP flexion and IP flexion. Passive wrist flexion stretch.  Therapist performed manual scar mobilization.      Assessment:     Upgraded dexterity work to beads today. Patient continues to report good compliance with home program. Patient to follow up Thursday.     Plan:      Planned Interventions include: therapeutic exercise, therapeutic activity, self-care home management, manual therapy, therapeutic activities, gait training, neuromuscular coordination, vasopneumatic, dry needling, aquatic therapy, electric stimulation, fluidotherapy, ultrasound, kinesiotaping, orthosis fabrication, wound care  Frequency: 2 x Week  Duration: 8 Weeks      Abdoulaye East OT

## 2024-06-20 ENCOUNTER — TREATMENT (OUTPATIENT)
Dept: OCCUPATIONAL THERAPY | Facility: HOSPITAL | Age: 39
End: 2024-06-20
Payer: COMMERCIAL

## 2024-06-20 DIAGNOSIS — S56.021A LACERATION OF FLEXOR TENDON OF RIGHT THUMB: Primary | ICD-10-CM

## 2024-06-20 PROCEDURE — 97140 MANUAL THERAPY 1/> REGIONS: CPT | Mod: GO | Performed by: OCCUPATIONAL THERAPIST

## 2024-06-20 PROCEDURE — 97110 THERAPEUTIC EXERCISES: CPT | Mod: GO | Performed by: OCCUPATIONAL THERAPIST

## 2024-06-20 NOTE — PROGRESS NOTES
Occupational Therapy  Occupational Therapy Orthopedic Progress Note    Patient Name: Bubba Lew  MRN: [id number]  Today's Date: 6/20/2024     Insurance:  Visit number: 12 of 12  Authorization info: approved  Insurance Type: BWC  Time:  Time Calculation  Start Time: 1355  Stop Time: 1435  Time Calculation (min): 40 min  OT Therapeutic Procedures Time Entry  Manual Therapy Time Entry: 10  Therapeutic Exercise Time Entry: 20    Insurance Type: Payor: [employer] / Plan: [employer] / Product Type: *No Product type* /     General:  Reason for visit: R thumb  Referred by: Dr. Montilla    Current Problem  1. Laceration of flexor tendon of right thumb              Precautions: per post op protocol - reviewed with patient today     Medical History Form: Reviewed (scanned into chart)    Subjective: I still have trouble grasping things in my hand. I still am not back to work. I can't lift things with my hand.  Chief Complaint: R thumb  HOOD: moving a metal pool water heater when it slipped and cut his right hand   DOI:  04/13/2024  DOS: 04/19/2024; Repair right thumb FPL tendon, Repair ulnar digital nerve right thumb, Full-thickness skin graft 25 mm x 15 mm right thenar eminence    Hand Dominance: Right    Current Condition since injury:   better      PAIN     Location: volar aspect of thumb  Intensity: 0/10  up to 3/10  Description: sharp, stinging  Aggravating Factors: ROM  Relieving Factors:  Rest     Relevant Information (PMH & Previous Tests/Imaging): reviewed in chart    Prior Level of Function (PLOF)  Work/School:    Current ADL/IADL Status: homemaking     Patients Living Environment: Reviewed and no concern    Primary Language: English    Pt goals for therapy: improve functional use of R thumb for manipulating objects in hand, cooking, cleaning, eating, opening jars, work tasks, and various household chores.    Red Flags: Do you have any of the following? No  Fever/chills, unexplained  weight changes, dizziness/fainting, unexplained change in bowel or bladder functions, unexplained malaise or muscle weakness, night pain/sweats, numbness or tingling    Objective:  Wrist extension/flexion: 50/60  Distance to DPC:  2nd: 0   3rd: 0    4th: 0    5th: 0    Passive digit flexion to DPC  Active /Passive thumb flexion  MCP: 50   /65      IP: 5/63    Physical Observation: moderate edema in thumb,    Numbness/Tingling: numbness along ulnar aspect of thumb    Outcome Measures:  Quick DASH: 43.18 was 75    EDUCATION: home exercise program, plan of care, activity modifications, pain management, and injury pathology     Goals:  Active       OT Goals       OT Goal 1       Start:  05/02/24    Expected End:  05/30/24       Patient to report compliance with orthosis wear schedule to protect surgical report and improve functional use of R thumb.         OT Goal 2       Start:  05/02/24    Expected End:  05/30/24       Achieve composite fist for manipulating objects in hand.         OT Goal 3       Start:  05/02/24    Expected End:  06/27/24       Improve Quick DASH to 15%.         OT Goal 4       Start:  05/02/24    Expected End:  06/27/24       Improve active thumb IP flexion to 60 degrees for handwriting.         OT Goal 5       Start:  05/02/24    Expected End:  06/27/24       Report max pain of 2/10 for work tasks.           Plan of care was developed with input and agreement by the patient    Treatments:    Modalities  10 min  Heating pad applied to circumferential hand.     Therapeutic Exercise:   20 min  HEP education and completion : active composite fist, digit flexion stretch, active wrist flexion , passive thumb flexion in orthosis and allowing thumb to extend back into orthosis, short arc gentle thumb flexion. - no bill  Passive IP flexion and MCP flexion.  Place and hold thumb flexion.  Thumb IP blocking.  Thumb MCP blocking.  Active thumb flexion holds.  Tenodesis wrist and radial/ulnar deviation with  thumb in passive flexed position.  AAROM wrist flexion and extension on ball.  Active thumb IP flexion and composite flexion.   Thumb slides on ball.  Active CMC opposition.   Therapist assisted with place and hold.  Sponge pickup with hand gripper.  Sponge pickup with red resistance clip.   Extension stretch with power web.    Manual Therapy:     10 min  Therapist performed manual thumb and digit flexion stretches: MCP flexion and IP flexion. Passive wrist flexion stretch.  Therapist performed manual scar mobilization.    Assessment:     Continued ROM and scar mobilization. Quick DASH score updated and improved. Patient issued yellow putty for grasping and pinching. Patient had good activity tolerance to exercises today. Patient would benefit from continued therapy services to progress thumb ROM and strength for improved functional use of R thumb.    Plan:      Planned Interventions include: therapeutic exercise, therapeutic activity, self-care home management, manual therapy, therapeutic activities, gait training, neuromuscular coordination, vasopneumatic, dry needling, aquatic therapy, electric stimulation, fluidotherapy, ultrasound, kinesiotaping, orthosis fabrication, wound care  Frequency: 2 x Week  Duration: 8 Weeks      Abdoulaye East, OT

## 2024-06-25 ENCOUNTER — DOCUMENTATION (OUTPATIENT)
Dept: OCCUPATIONAL THERAPY | Facility: HOSPITAL | Age: 39
End: 2024-06-25
Payer: COMMERCIAL

## 2024-06-25 NOTE — PROGRESS NOTES
Occupational Therapy                 Therapy Communication Note    Patient Name: Bubba Lew  MRN: [id number]  Today's Date: 6/25/2024     Discipline: Occupational Therapy    Missed Visit Reason:      Missed Time: No Show    Comment:

## 2024-06-27 ENCOUNTER — APPOINTMENT (OUTPATIENT)
Dept: OCCUPATIONAL THERAPY | Facility: HOSPITAL | Age: 39
End: 2024-06-27
Payer: COMMERCIAL

## 2024-07-02 ENCOUNTER — APPOINTMENT (OUTPATIENT)
Dept: OCCUPATIONAL THERAPY | Facility: HOSPITAL | Age: 39
End: 2024-07-02
Payer: COMMERCIAL

## 2024-07-05 ENCOUNTER — DOCUMENTATION (OUTPATIENT)
Dept: OCCUPATIONAL THERAPY | Facility: HOSPITAL | Age: 39
End: 2024-07-05
Payer: COMMERCIAL

## 2024-07-09 ENCOUNTER — APPOINTMENT (OUTPATIENT)
Dept: OCCUPATIONAL THERAPY | Facility: HOSPITAL | Age: 39
End: 2024-07-09
Payer: COMMERCIAL

## 2024-07-11 ENCOUNTER — APPOINTMENT (OUTPATIENT)
Dept: OCCUPATIONAL THERAPY | Facility: HOSPITAL | Age: 39
End: 2024-07-11
Payer: COMMERCIAL

## 2024-07-12 NOTE — PROGRESS NOTES
Bubba returns to follow-up on his right hand.  He sustained a traumatic wound to his right thumb and distal thenar eminence and mid April resulting in a flexor pollicis longus laceration and injury to the ulnar digital nerve.  He was taken to the operating room a few days after the injury.  He had full-thickness skin loss over the base of the thumb on the flexor surface.  We performed ulnar digital nerve repair and FPL tendon repair.  The wound was then covered with a full-thickness skin graft from the proximal forearm.  He has not made any real progress with attempts to reestablish active thumb range of motion.  He has not done any formal therapy in the last several weeks.  He does report that he is having improving sensation on the ulnar border of the thumb.    Examination today reveals well-healed traumatic and surgical wounds.  His full-thickness skin graft has well incorporated.  His thumb is well-perfused.  He has improving subjective sensation on the ulnar border of the thumb.  Sensation on the radial border of the thumb is subjectively normal.  He has good CMC joint motion.  Mild limitations to MP joint motion.  Only a trace amount of active thumb IP joint flexion.  Full passive thumb IP joint flexion.    Impression: Sequela of right thumb injury.    Plan: Right now it is difficult to determine whether or not his tendon repair has ruptured or if it is simply become encased in scar through the injury zone.  He does have some evidence of active thumb IP joint flexion which is suggestive of an intact tendon.  He has not made any real progress with attempts at range of motion.  I have offered him revision surgery.  This would be an exploration with tenolysis of the FPL tendon if that tendon repair is intact.  However, if the tendon has ruptured then we would either need to do primary reconstruction with his palmaris longus or possible staged tendon reconstruction with placement of a synthetic tendon keely.  We have  discussed all of this in detail.  He wishes to proceed because he indicates that his current thumb range of motion impairs function.  We will submit a C9 requesting authorization for the surgery and postoperative therapy.  Once we get authorization to proceed with surgery we can get him scheduled.    For Surgical Planning:  Diagnosis: Sequela of right thumb laceration  Procedure: Right thumb exploration with flexor tendon tenolysis, possible tendon reconstruction with tendon graft, possible insertion of a synthetic tendon keely for staged tendon reconstruction  CPT: 17211, possible 62893, possible 68903  Anesthesia: General  Duration: 1 hour  Special Equipment Needed: Tani / [employer] Xiang tendon rods  Medical Notes / PM / DM / PAT needed?:  N/A  Location: Any  Initial Post Operative Visit: Following Monday [facility] slot

## 2024-07-16 ENCOUNTER — OFFICE VISIT (OUTPATIENT)
Dept: ORTHOPEDIC SURGERY | Facility: HOSPITAL | Age: 39
End: 2024-07-16
Payer: COMMERCIAL

## 2024-07-16 ENCOUNTER — APPOINTMENT (OUTPATIENT)
Dept: OCCUPATIONAL THERAPY | Facility: HOSPITAL | Age: 39
End: 2024-07-16
Payer: COMMERCIAL

## 2024-07-16 VITALS — WEIGHT: 157 LBS | HEIGHT: 67 IN | BODY MASS INDEX: 24.64 KG/M2

## 2024-07-16 DIAGNOSIS — S56.021A LACERATION OF FLEXOR TENDON OF RIGHT THUMB: Primary | ICD-10-CM

## 2024-07-16 PROCEDURE — 3008F BODY MASS INDEX DOCD: CPT | Performed by: ORTHOPAEDIC SURGERY

## 2024-07-16 PROCEDURE — 99211 OFF/OP EST MAY X REQ PHY/QHP: CPT | Performed by: ORTHOPAEDIC SURGERY

## 2024-07-16 PROCEDURE — 4004F PT TOBACCO SCREEN RCVD TLK: CPT | Performed by: ORTHOPAEDIC SURGERY

## 2024-07-16 ASSESSMENT — PAIN - FUNCTIONAL ASSESSMENT: PAIN_FUNCTIONAL_ASSESSMENT: 0-10

## 2024-07-16 ASSESSMENT — PAIN SCALES - GENERAL: PAINLEVEL_OUTOF10: 5 - MODERATE PAIN

## 2024-07-16 ASSESSMENT — PAIN DESCRIPTION - DESCRIPTORS: DESCRIPTORS: ACHING;SORE

## 2024-07-19 DIAGNOSIS — S66.021S: Primary | ICD-10-CM

## 2024-08-19 DIAGNOSIS — S56.021A: ICD-10-CM

## 2024-08-28 ENCOUNTER — ANESTHESIA EVENT (OUTPATIENT)
Dept: OPERATING ROOM | Facility: HOSPITAL | Age: 39
End: 2024-08-28
Payer: COMMERCIAL

## 2024-08-29 ENCOUNTER — HOSPITAL ENCOUNTER (OUTPATIENT)
Facility: HOSPITAL | Age: 39
Setting detail: OUTPATIENT SURGERY
Discharge: HOME | End: 2024-08-29
Attending: ORTHOPAEDIC SURGERY | Admitting: ORTHOPAEDIC SURGERY
Payer: COMMERCIAL

## 2024-08-29 ENCOUNTER — ANESTHESIA (OUTPATIENT)
Dept: OPERATING ROOM | Facility: HOSPITAL | Age: 39
End: 2024-08-29
Payer: COMMERCIAL

## 2024-08-29 VITALS
HEIGHT: 67 IN | OXYGEN SATURATION: 95 % | RESPIRATION RATE: 11 BRPM | TEMPERATURE: 97.7 F | HEART RATE: 73 BPM | SYSTOLIC BLOOD PRESSURE: 118 MMHG | BODY MASS INDEX: 24.46 KG/M2 | DIASTOLIC BLOOD PRESSURE: 92 MMHG | WEIGHT: 155.87 LBS

## 2024-08-29 DIAGNOSIS — S56.021A LACERATION OF FLEXOR TENDON OF RIGHT THUMB: Primary | ICD-10-CM

## 2024-08-29 DIAGNOSIS — S56.021A: ICD-10-CM

## 2024-08-29 PROBLEM — J45.909 UNCOMPLICATED ASTHMA (HHS-HCC): Status: ACTIVE | Noted: 2024-08-29

## 2024-08-29 PROCEDURE — 3600000007 HC OR TIME - EACH INCREMENTAL 1 MINUTE - PROCEDURE LEVEL TWO: Performed by: ORTHOPAEDIC SURGERY

## 2024-08-29 PROCEDURE — 7100000010 HC PHASE TWO TIME - EACH INCREMENTAL 1 MINUTE: Performed by: ORTHOPAEDIC SURGERY

## 2024-08-29 PROCEDURE — 2720000007 HC OR 272 NO HCPCS: Performed by: ORTHOPAEDIC SURGERY

## 2024-08-29 PROCEDURE — 3700000002 HC GENERAL ANESTHESIA TIME - EACH INCREMENTAL 1 MINUTE: Performed by: ORTHOPAEDIC SURGERY

## 2024-08-29 PROCEDURE — C1713 ANCHOR/SCREW BN/BN,TIS/BN: HCPCS | Performed by: ORTHOPAEDIC SURGERY

## 2024-08-29 PROCEDURE — 3700000001 HC GENERAL ANESTHESIA TIME - INITIAL BASE CHARGE: Performed by: ORTHOPAEDIC SURGERY

## 2024-08-29 PROCEDURE — 7100000001 HC RECOVERY ROOM TIME - INITIAL BASE CHARGE: Performed by: ORTHOPAEDIC SURGERY

## 2024-08-29 PROCEDURE — 2500000001 HC RX 250 WO HCPCS SELF ADMINISTERED DRUGS (ALT 637 FOR MEDICARE OP): Performed by: ANESTHESIOLOGY

## 2024-08-29 PROCEDURE — 7100000009 HC PHASE TWO TIME - INITIAL BASE CHARGE: Performed by: ORTHOPAEDIC SURGERY

## 2024-08-29 PROCEDURE — 3600000002 HC OR TIME - INITIAL BASE CHARGE - PROCEDURE LEVEL TWO: Performed by: ORTHOPAEDIC SURGERY

## 2024-08-29 PROCEDURE — 7100000002 HC RECOVERY ROOM TIME - EACH INCREMENTAL 1 MINUTE: Performed by: ORTHOPAEDIC SURGERY

## 2024-08-29 PROCEDURE — 2500000004 HC RX 250 GENERAL PHARMACY W/ HCPCS (ALT 636 FOR OP/ED): Mod: JZ | Performed by: ORTHOPAEDIC SURGERY

## 2024-08-29 PROCEDURE — 2500000004 HC RX 250 GENERAL PHARMACY W/ HCPCS (ALT 636 FOR OP/ED)

## 2024-08-29 PROCEDURE — 2500000005 HC RX 250 GENERAL PHARMACY W/O HCPCS

## 2024-08-29 PROCEDURE — 2500000004 HC RX 250 GENERAL PHARMACY W/ HCPCS (ALT 636 FOR OP/ED): Performed by: ANESTHESIOLOGY

## 2024-08-29 DEVICE — IMPLANTABLE DEVICE: Type: IMPLANTABLE DEVICE | Site: THUMB | Status: FUNCTIONAL

## 2024-08-29 RX ORDER — LIDOCAINE HYDROCHLORIDE 20 MG/ML
INJECTION, SOLUTION INFILTRATION; PERINEURAL AS NEEDED
Status: DISCONTINUED | OUTPATIENT
Start: 2024-08-29 | End: 2024-08-29

## 2024-08-29 RX ORDER — ONDANSETRON HYDROCHLORIDE 2 MG/ML
INJECTION, SOLUTION INTRAVENOUS AS NEEDED
Status: DISCONTINUED | OUTPATIENT
Start: 2024-08-29 | End: 2024-08-29

## 2024-08-29 RX ORDER — CEFAZOLIN 1 G/1
INJECTION, POWDER, FOR SOLUTION INTRAVENOUS AS NEEDED
Status: DISCONTINUED | OUTPATIENT
Start: 2024-08-29 | End: 2024-08-29

## 2024-08-29 RX ORDER — ONDANSETRON HYDROCHLORIDE 2 MG/ML
4 INJECTION, SOLUTION INTRAVENOUS ONCE AS NEEDED
Status: DISCONTINUED | OUTPATIENT
Start: 2024-08-29 | End: 2024-08-29 | Stop reason: HOSPADM

## 2024-08-29 RX ORDER — BUPIVACAINE HYDROCHLORIDE 5 MG/ML
INJECTION, SOLUTION EPIDURAL; INTRACAUDAL AS NEEDED
Status: DISCONTINUED | OUTPATIENT
Start: 2024-08-29 | End: 2024-08-29 | Stop reason: HOSPADM

## 2024-08-29 RX ORDER — LABETALOL HYDROCHLORIDE 5 MG/ML
5 INJECTION, SOLUTION INTRAVENOUS ONCE AS NEEDED
Status: DISCONTINUED | OUTPATIENT
Start: 2024-08-29 | End: 2024-08-29 | Stop reason: HOSPADM

## 2024-08-29 RX ORDER — OXYCODONE HYDROCHLORIDE 5 MG/1
5 TABLET ORAL EVERY 4 HOURS PRN
Status: DISCONTINUED | OUTPATIENT
Start: 2024-08-29 | End: 2024-08-29 | Stop reason: HOSPADM

## 2024-08-29 RX ORDER — LIDOCAINE HYDROCHLORIDE 10 MG/ML
0.1 INJECTION, SOLUTION EPIDURAL; INFILTRATION; INTRACAUDAL; PERINEURAL ONCE
Status: DISCONTINUED | OUTPATIENT
Start: 2024-08-29 | End: 2024-08-29 | Stop reason: HOSPADM

## 2024-08-29 RX ORDER — HYDRALAZINE HYDROCHLORIDE 20 MG/ML
5 INJECTION INTRAMUSCULAR; INTRAVENOUS EVERY 30 MIN PRN
Status: DISCONTINUED | OUTPATIENT
Start: 2024-08-29 | End: 2024-08-29 | Stop reason: HOSPADM

## 2024-08-29 RX ORDER — PROPOFOL 10 MG/ML
INJECTION, EMULSION INTRAVENOUS AS NEEDED
Status: DISCONTINUED | OUTPATIENT
Start: 2024-08-29 | End: 2024-08-29

## 2024-08-29 RX ORDER — OXYCODONE HYDROCHLORIDE 5 MG/1
5 TABLET ORAL EVERY 6 HOURS PRN
Qty: 28 TABLET | Refills: 0 | Status: SHIPPED | OUTPATIENT
Start: 2024-08-29 | End: 2024-09-05

## 2024-08-29 RX ORDER — SODIUM CHLORIDE, SODIUM LACTATE, POTASSIUM CHLORIDE, CALCIUM CHLORIDE 600; 310; 30; 20 MG/100ML; MG/100ML; MG/100ML; MG/100ML
100 INJECTION, SOLUTION INTRAVENOUS CONTINUOUS
Status: DISCONTINUED | OUTPATIENT
Start: 2024-08-29 | End: 2024-08-29 | Stop reason: HOSPADM

## 2024-08-29 RX ORDER — ALBUTEROL SULFATE 0.83 MG/ML
2.5 SOLUTION RESPIRATORY (INHALATION) ONCE AS NEEDED
Status: DISCONTINUED | OUTPATIENT
Start: 2024-08-29 | End: 2024-08-29 | Stop reason: HOSPADM

## 2024-08-29 RX ORDER — FENTANYL CITRATE 50 UG/ML
INJECTION, SOLUTION INTRAMUSCULAR; INTRAVENOUS AS NEEDED
Status: DISCONTINUED | OUTPATIENT
Start: 2024-08-29 | End: 2024-08-29

## 2024-08-29 RX ORDER — MIDAZOLAM HYDROCHLORIDE 1 MG/ML
INJECTION INTRAMUSCULAR; INTRAVENOUS AS NEEDED
Status: DISCONTINUED | OUTPATIENT
Start: 2024-08-29 | End: 2024-08-29

## 2024-08-29 RX ORDER — ONDANSETRON HYDROCHLORIDE 2 MG/ML
4 INJECTION, SOLUTION INTRAVENOUS ONCE AS NEEDED
Status: COMPLETED | OUTPATIENT
Start: 2024-08-29 | End: 2024-08-29

## 2024-08-29 RX ADMIN — HYDROMORPHONE HYDROCHLORIDE 0.5 MG: 1 INJECTION, SOLUTION INTRAMUSCULAR; INTRAVENOUS; SUBCUTANEOUS at 11:17

## 2024-08-29 RX ADMIN — HYDROMORPHONE HYDROCHLORIDE 0.5 MG: 1 INJECTION, SOLUTION INTRAMUSCULAR; INTRAVENOUS; SUBCUTANEOUS at 11:50

## 2024-08-29 RX ADMIN — HYDROMORPHONE HYDROCHLORIDE 0.5 MG: 1 INJECTION, SOLUTION INTRAMUSCULAR; INTRAVENOUS; SUBCUTANEOUS at 11:40

## 2024-08-29 RX ADMIN — HYDROMORPHONE HYDROCHLORIDE 0.5 MG: 1 INJECTION, SOLUTION INTRAMUSCULAR; INTRAVENOUS; SUBCUTANEOUS at 11:35

## 2024-08-29 RX ADMIN — ONDANSETRON 4 MG: 2 INJECTION INTRAMUSCULAR; INTRAVENOUS at 11:51

## 2024-08-29 RX ADMIN — HYDROMORPHONE HYDROCHLORIDE 0.5 MG: 1 INJECTION, SOLUTION INTRAMUSCULAR; INTRAVENOUS; SUBCUTANEOUS at 11:27

## 2024-08-29 RX ADMIN — OXYCODONE HYDROCHLORIDE 5 MG: 5 TABLET ORAL at 11:58

## 2024-08-29 RX ADMIN — HYDROMORPHONE HYDROCHLORIDE 0.5 MG: 1 INJECTION, SOLUTION INTRAMUSCULAR; INTRAVENOUS; SUBCUTANEOUS at 11:00

## 2024-08-29 ASSESSMENT — PAIN SCALES - GENERAL
PAINLEVEL_OUTOF10: 3
PAINLEVEL_OUTOF10: 7
PAINLEVEL_OUTOF10: 3
PAINLEVEL_OUTOF10: 3
PAINLEVEL_OUTOF10: 7
PAINLEVEL_OUTOF10: 3
PAINLEVEL_OUTOF10: 0 - NO PAIN
PAINLEVEL_OUTOF10: 8
PAINLEVEL_OUTOF10: 7
PAINLEVEL_OUTOF10: 0 - NO PAIN
PAINLEVEL_OUTOF10: 10 - WORST POSSIBLE PAIN
PAINLEVEL_OUTOF10: 3
PAINLEVEL_OUTOF10: 3
PAINLEVEL_OUTOF10: 0 - NO PAIN

## 2024-08-29 ASSESSMENT — PAIN DESCRIPTION - ORIENTATION
ORIENTATION: RIGHT
ORIENTATION: RIGHT

## 2024-08-29 ASSESSMENT — PAIN - FUNCTIONAL ASSESSMENT
PAIN_FUNCTIONAL_ASSESSMENT: 0-10
PAIN_FUNCTIONAL_ASSESSMENT: VAS (VISUAL ANALOG SCALE)
PAIN_FUNCTIONAL_ASSESSMENT: 0-10
PAIN_FUNCTIONAL_ASSESSMENT: VAS (VISUAL ANALOG SCALE)
PAIN_FUNCTIONAL_ASSESSMENT: 0-10

## 2024-08-29 ASSESSMENT — PAIN DESCRIPTION - LOCATION
LOCATION: HAND
LOCATION: HAND

## 2024-08-29 NOTE — ANESTHESIA PREPROCEDURE EVALUATION
Patient: Bubba Lew    Procedure Information       Date/Time: 08/29/24 0900    Procedure: Right Thumb Exploration with Flexor Tendon Tenolysis; Possible Tendon Reconstruction with Tendon Graft; Possible Insertion of a Synthetic Tendon Yosef for Staged Tendon Reconstruction (Right: Thumb)    Location: [facility] / [facility]    Surgeons: Yves Montilla MD          39yo M, hx of right thumb laceration, who requires anesthesia for Rt thumb exploration w/ possible tendon graft.    Relevant Problems   Anesthesia (within normal limits)      Cardiac (within normal limits)      Pulmonary   (+) Uncomplicated asthma (HHS-HCC) (Childhood, no episodes since, not on medication)      Neuro (within normal limits)      GI (within normal limits)      /Renal (within normal limits)      Liver (within normal limits)      Endocrine (within normal limits)      Hematology (within normal limits)      Musculoskeletal (within normal limits)   (+) Laceration of flexor muscle, fascia and tendon of right thumb at forearm level, initial encounter      HEENT (within normal limits)      ID (within normal limits)      Skin (within normal limits)      GYN (within normal limits)      Musculoskeletal and Injuries   (+) Laceration of flexor tendon of right thumb       Clinical information reviewed:   Tobacco  Allergies  Meds   Med Hx  Surg Hx   Fam Hx  Soc Hx        NPO Detail:  NPO/Void Status  Carbohydrate Drink Given Prior to Surgery? : N  Date of Last Liquid: 08/29/24  Time of Last Liquid: 0000  Date of Last Solid: 08/28/24  Time of Last Solid: 2100  Last Intake Type: Clear fluids (water)  Time of Last Void: 0700         Physical Exam    Airway  Mallampati: II  TM distance: >3 FB  Neck ROM: full     Cardiovascular   Rate: normal     Dental    Pulmonary    Abdominal          Anesthesia Plan    History of general anesthesia?: yes  History of complications of general anesthesia?: no    ASA 2     general     intravenous induction    Postoperative administration of opioids is intended.  Anesthetic plan and risks discussed with patient.  Use of blood products discussed with patient who consented to blood products.    Plan discussed with attending.

## 2024-08-29 NOTE — OP NOTE
Right Thumb Exploration with Flexor Tendon Tenolysis;  Insertion of a Synthetic Tendon Yosef for Staged Tendon Reconstruction (R) Operative Note     Date: 2024  OR Location: [facility] OR    Name: Bubba Lew, : 1985, Age: 38 y.o., MRN: [id number], Sex: male    Diagnosis  Pre-op Diagnosis      * Laceration of flexor muscle, fascia and tendon of right thumb at forearm level, initial encounter [S56.021A] Post-op Diagnosis     * Laceration of flexor muscle, fascia and tendon of right thumb at forearm level, initial encounter [S56.021A]     Procedures  Right thumb exploration with excision of ruptured FPL tendon, insertion Xiang passive yosef, pulley reconstruction using autograft tendon  Surgeons      * Yves Montilla - Primary    Resident/Fellow/Other Assistant:  Surgeons and Role:  * No surgeons found with a matching role *    Procedure Summary  Anesthesia: General  ASA: II  Anesthesia Staff: Anesthesiologist: Nirav Roa MD  CRNA: JUAN Figueroa; JUAN Knutson, BISHOP  Estimated Blood Loss: 2 mL  Intra-op Medications:   Administrations occurring from 0900 to 1030 on 24:   Medication Name Total Dose   bupivacaine PF (Marcaine) 0.5 % (5 mg/mL) injection 10 mL              Anesthesia Record               Intraprocedure I/O Totals       None           Specimen: No specimens collected     Staff:   Circulator: Lauren Grijalva Person: Yesenia Mora Circulator: Elizabeth         Drains and/or Catheters: * None in log *    Tourniquet Times:   * Missing tourniquet times found for documented tourniquets in lo *     Implants:  Implants       Type Name Action Serial No.      Yosef GALDAMEZ TENDON SPACER Implanted NA              Findings: Rupture of the FPL tendon with proximal retraction to the MCP joint    Indications: Bubba Lew is an 38 y.o. male who is having surgery for Laceration of flexor muscle, fascia and tendon of right thumb at forearm level, initial encounter  [S58.021A].      The patient was seen in the preoperative area. The risks, benefits, complications, treatment options, non-operative alternatives, expected recovery and outcomes were discussed with the patient. The possibilities of reaction to medication, pulmonary aspiration, injury to surrounding structures, bleeding, recurrent infection, the need for additional procedures, failure to diagnose a condition, and creating a complication requiring transfusion or operation were discussed with the patient. The patient concurred with the proposed plan, giving informed consent.  The site of surgery was properly noted/marked if necessary per policy. The patient has been actively warmed in preoperative area. Preoperative antibiotics are not indicated. Venous thrombosis prophylaxis are not indicated.    Procedure Details:   38-year-old right-hand-dominant gentleman who sustained a work-related injury to his right hand earlier this year resulting in zone 2 FPL tendon laceration, ulnar digital nerve laceration and a complex soft tissue injury with skin loss.  Initial treatment under my care was for a primary FPL tendon repair, ulnar digital nerve repair and acute full-thickness skin grafting.  He was then engaged into therapy but in therapy he did not gain functional active thumb IP joint flexion although his sensation continue to improve.  Because of the limited ability to flex the thumb he presents today for exploration with tenolysis versus staged tendon reconstruction initiation.  Preoperatively the right hand was notified and marked for surgery.  Informed consent process was completed.    Patient was brought to the operating and placed supine on the operating table.  A timeout procedure was performed to verify the correct patient, procedure and operative site.  Intravenous antibiotics were administered.  General anesthetic initiated by the anesthesia service.  Right upper extremity was prepped and draped in usual sterile  fashion.  Limb was exsanguinated and the tourniquet was inflated to 250 mmHg.    The prior traumatic and surgical wound was marked out on the ulnar aspect of the thumb and then extending proximally over the distal aspect of the thenar eminence.  We began by making an incision through the prior scar proximally with the assumption that the adhesions were underneath the previously placed full-thickness graft.  As the skin was incised we began to mobilize the flaps.  We encountered significant scar underneath the previously placed full-thickness skin graft.  We identified the ulnar digital nerve and carefully mobilized the structure in an ulnar direction away from the flexor tendon sheath.  Proximally we also identified the radial digital nerve so that we had identification of where the structure was located throughout this procedure.  As we carefully performed a tenolysis to expose the FPL tendon we very quickly realized that the FPL tendon had ruptured because we were seeing the Ethibond sutures at the level of the MP joint where as the repair was much further distal.  The skin incision was then extended distally throughout the entire length of the prior surgery.  This allowed us to then mobilized the flap in a radial direction and expose the flexor tendon sheath.  There was significant scarring.  We identified the distal stump of the FPL tendon.  Tenolysis was performed to mobilize the structure from underneath the distal portion of the A2 pulley.  The oblique pulley was identified and preserved.  The A1 pulley had been eliminated as part of his initial traumatic injury.  We then performed a tenolysis to mobilize the proximal retracted portion of the FPL tendon and was able to traced the structure all the way into the floor of the carpal tunnel.  Based on these findings we now knew that the most appropriate treatment was for insertion of a synthetic tendon keely for initiation of stage II flexor tendon reconstruction  of the right thumb.    We selected a 5 mm Xiang tendon keely.  This was passed proximally deep to the FPL tendon in the floor the carpal tunnel into the distal forearm.  Distally it was passed underneath the oblique pulley and the A2 pulley and then sutured to the terminal stump of the FPL tendon.  Now with tension applied to the tendon keely we are able to demonstrate normal IP joint flexion.    We now turned our attention to the retracted portion of the FPL tendon.  We excised roughly 2 cm segment of the terminal aspect of the FPL tendon so that it was allowed to retract further into the palm and away from the surgical field but should be easily identified at subsequent surgery.  The resected portion of the FPL tendon was then used to reconstruct the A1 pulley to minimize bowstringing with ultimate tendon reconstruction.    The wound was copiously irrigated and then closed up to fashion.  Local anesthetic infiltrated around the wound margins.  Sterile bandages were applied and tourniquet was deflated.  The patient was placed into a well-padded short arm thumb spica splint.  He was awoken from his anesthetic and transferred to the recovery in stable condition.    Postoperatively he will be discharged home once comfortable.  He will return to clinic early next week and at that time we will refer him to therapy for wound care and initiation of passive range of motion so that a smooth gliding surface can be developed around his synthetic keely.  Once his wounds have healed and he is restored passive range of motion then he will be a candidate for stage II tendon reconstruction.  He does have a palmaris longus present and that will likely be the tendon graft used for tendon reconstruction.        Complications:  None; patient tolerated the procedure well.    Disposition: PACU - hemodynamically stable.  Condition: stable         Additional Details:      Attending Attestation: I was present and scrubbed for the entire  procedure.    Yves Montilla  Phone Number: [phone]

## 2024-08-29 NOTE — PROGRESS NOTES
[facility]  Hand and Upper Extremity Service  Post Operative visit         Date of surgery: 8/29/24    Surgery(s) performed: Right thumb exploration with flexor tendon stage I reconstruction       Subjective report: First postoperative visit. Overall doing well and pain is well controlled.        Exam findings: Right hand reveals healing surgical incision. Sensation intact to light touch at tip of thumb.        Radiograph findings: No new images obtained       Impression: Sequela of right thumb laceration with nearly identified FPL tendon rupture      Plan: We will initiate therapy today for soft tissue healing and establishment of passive range of motion. He'll need subsequent surgery for tendon insertion into tendon sheath. That typically takes about 3 months until the soft tissues are appropriately recovered from surgery last week. He'll follow up in 2 weeks for wound check and suture removal and we'll monitor his progress in recovery to determine when to do stage 2 of FPL tendon reconstruction. He remains unable to return to work at this time.        Follow Up: 2 weeks            Yves Montilla MD    [facility] [employer]  Department of Orthopaedic Surgery  Chief of Hand and Upper Extremity Surgery  [facility]    Scribe Attestation  By signing my name below, IJerald Scribe   attest that this documentation has been prepared under the direction and in the presence of Dr. Yves Montilla.      Dictation performed with the use of voice recognition software. Syntax and grammatical errors may exist.

## 2024-08-29 NOTE — ANESTHESIA POSTPROCEDURE EVALUATION
Patient: Bubba Lwe    Procedure Summary       Date: 08/29/24 Room / Location: U A OR 17 / Virtual U A OR    Anesthesia Start: 0923 Anesthesia Stop: 1033    Procedure: Right Thumb Exploration with Flexor Tendon Tenolysis;  Insertion of a Synthetic Tendon Yosef for Staged Tendon Reconstruction (Right: Thumb) Diagnosis:       Laceration of flexor muscle, fascia and tendon of right thumb at forearm level, initial encounter      (Laceration of flexor muscle, fascia and tendon of right thumb at forearm level, initial encounter [S56.021A])    Surgeons: Yves Montilla MD Responsible Provider: Nirav Roa MD    Anesthesia Type: general ASA Status: 2            Anesthesia Type: general    Vitals Value Taken Time   /90 08/29/24 1215   Temp 36.5 °C (97.7 °F) 08/29/24 1035   Pulse 73 08/29/24 1218   Resp 11 08/29/24 1218   SpO2 93 % 08/29/24 1218   Vitals shown include unfiled device data.    Anesthesia Post Evaluation    Patient participation: complete - patient participated  Level of consciousness: awake  Pain management: satisfactory to patient  Airway patency: patent  Cardiovascular status: acceptable and hemodynamically stable  Respiratory status: acceptable and nonlabored ventilation  Hydration status: balanced  Postoperative Nausea and Vomiting: none        No notable events documented.

## 2024-08-29 NOTE — H&P
History Of Present Illness  Bubba Lew is a 38 y.o. male with PSH fof right FPL repair, Repair ulnar digital nerve right thumb, and full-thickness skin graft 25 mm x 15 mm right thenar eminence on 4/19/24 presenting now with concerns for repair failure. He is here today for exploration with tenolysis of the FPL tendon, possible primary reconstruction with his palmaris longus, possible staged tendon reconstruction with placement of a synthetic tendon keely. We have discussed all of this in detail. He wishes to proceed because he indicates that his current thumb range of motion impairs function.      Past Medical History  He has a past medical history of Asthma ([facility]), Back pain, Congenital spondylolisthesis, Fibromyalgia, primary, Laceration of flexor tendon of right thumb, and Tonsillitis.    Surgical History  He has a past surgical history that includes Tonsillectomy and Hand surgery (Right).     Social History  He reports that he has never smoked. He has never been exposed to tobacco smoke. His smokeless tobacco use includes chew. He reports that he does not currently use alcohol. He reports that he does not currently use drugs.    Family History  No family history on file.     Allergies  Penicillins    Review of Systems  Constitutional:  No Weight Change, No Fever, No Chills, No Night Sweats, No Fatigue, No Malaise  ENT/Mouth:  No Hearing Changes, No Ear Pain, No Nasal Congestion, No  Sinus Pain, No Hoarseness, No sore throat, No Rhinorrhea, No Swallowing  Difficulty  Eyes:  No Eye Pain, No Swelling, No Redness, No Foreign Body, No Discharge, No Vision Changes  Cardiovascular:  No Chest Pain, No SOB, No PND, No Dyspnea on Exertion,  No Orthopnea, No Claudication, No Edema, No Palpitations  Respiratory:  No Cough, No Sputum, No Wheezing, No Smoke Exposure, No Dyspnea  Gastrointestinal:  No Nausea, No Vomiting, No Diarrhea, No  Constipation, No Pain, No Heartburn, No Anorexia, No Dysphagia, No  Hematochezia, No  "Melena, No Flatulence, No Jaundice  Genitourinary:  No Dysmenorrhea, No DUB, No Dyspareunia, No Dysuria, No  Urinary Frequency, No Hematuria, No Urinary Incontinence, No Urgency,  No Flank Pain, No Urinary Flow Changes, No Hesitancy  Musculoskeletal:  As per HPI  Neuro:  No Weakness, No Numbness, No Paresthesias, No Loss of  Consciousness, No Syncope, No Dizziness, No Headache, No Coordination  Changes, No Recent Falls  Heme/Lymph:  No Bruising, No Bleeding, No Transfusions History, No Lymphadenopathy    Physical Exam  General: well-appearing, NAD  CV: regular rate and rhythm, 2+ peripheral pulses  Lungs: breathing nonlabored  Right hand:  Well-healed traumatic and surgical wounds. His full-thickness skin graft has well incorporated. His thumb is well-perfused. He has improving subjective sensation on the ulnar border of the thumb. Sensation on the radial border of the thumb is subjectively normal. He has good CMC joint motion. Mild limitations to MP joint motion. Only a trace amount of active thumb IP joint flexion. Full passive thumb IP joint flexion.     Last Recorded Vitals  Blood pressure (!) 144/95, pulse 75, temperature 36.4 °C (97.5 °F), temperature source Temporal, resp. rate 17, height 1.702 m (5' 7\"), weight 70.7 kg (155 lb 13.8 oz), SpO2 98%.    Relevant Results      Scheduled medications    Continuous medications    PRN medications    No results found for this or any previous visit (from the past 24 hour(s)).    Assessment/Plan   Assessment & Plan  Laceration of flexor muscle, fascia and tendon of right thumb at forearm level, initial encounter    Uncomplicated asthma ([facility])      Bubba Vanjamee is a 38 y.o. male with PSH fof right FPL repair, Repair ulnar digital nerve right thumb, and full-thickness skin graft 25 mm x 15 mm right thenar eminence on 4/19/24 presenting now with concerns for repair failure. He is here today for exploration with tenolysis of the FPL tendon, possible primary reconstruction " with his palmaris longus, possible staged tendon reconstruction with placement of a synthetic tendon keely.     We discussed risks, benefits, alternatives and anticipated post op course including time away from work and activities following surgical treatment for the patient's condition. This is major surgery with identifiable risks. No guarantees for success have been provided. The patient has expressed understanding and has elected to pursue operative treatment.     Patient is consented for surgery.    Kerry Pelletier MD

## 2024-08-29 NOTE — ANESTHESIA PROCEDURE NOTES
Airway  Date/Time: 8/29/2024 9:31 AM  Urgency: elective    Airway not difficult    Staffing  Performed: SRNA   Authorized by: Nirav Roa MD    Performed by: Yovana Danielle  Patient location during procedure: OR    Indications and Patient Condition  Indications for airway management: anesthesia  Spontaneous Ventilation: absent  Sedation level: deep  Preoxygenated: yes  Patient position: sniffing  MILS maintained throughout  Mask difficulty assessment: 1 - vent by mask  Planned trial extubation    Final Airway Details  Final airway type: supraglottic airway      Successful airway: ProSeal (iGel)  Size 4     Number of attempts at approach: 1  Number of other approaches attempted: 0

## 2024-08-29 NOTE — DISCHARGE INSTRUCTIONS
Post-Operative Instructions  Dr. Yves Montilla [phone]    Dressing:  You have a bandage or splint covering your operative site.    Do not remove the dressing until your next scheduled appointment with your surgeon or therapist. Keep your dressing clean and dry. The dressing will be removed at that appointment.     Post Anesthesia Instructions:  If you received general anesthesia or IV sedation for your procedure for the next 24 hours: No driving, no drinking alcohol, no sleeping aids, no important decision making, and have an adult with you for 24 hours.    Showering:  You may shower following surgery but please adhere to above instructions regarding the dressing. If showering with bandage/splint in place please ensure that it is kept dry and covered while bathing. There are commercially available cast bags that can be used to protect your dressing while showering. If using garbage bags please make sure that there are no holes in the bag and that the bag has been sealed above the dressing. If the bandage gets wet you must contact your surgeon's office to make arrangements to be seen to have the bandage changed.     Ice/Elevation:  The application of ice to your surgical site after surgery will help with pain control and swelling. This can be very effective for 48-72 hours after surgery. Please be careful to avoid getting bandage wet from a leaky ice bag. Please be advised that the ice may need to be applied for longer periods of time for the cooling effect to penetrate the post-operative dressing.     Elevation of the operative site above the level of the heart as much as possible for the first 48-72 hours after surgery. Use your sling if you have been provided one while standing or walking. If your fingers are not included in the dressing you are encouraged to attempt finger range of motion as this will help with your hand swelling and ultimate recovery.    Pain Medication:  If you received a regional  anesthetic on the day of your surgery your arm and hand may be numb for up to 24 hours after surgery. It is important to wear your sling while the block is still effective in order to protect your arm. It is advisable to take pain medications prior to going to sleep in case the regional anesthesia medication wears off while you are sleeping.    Take your pain medications as directed. Narcotic pain medications can cause lethargy, nausea and constipation. Please contact your surgeon's office and discontinue the medication if these symptoms become problematic. Eating a regular diet, drinking fluids and walking can help with constipation from these medications. Avoid alcohol consumption and driving while taking narcotic pain medications.     Additional pain control options:     You are encouraged to take over the counter medications like Advil / Motrin / Ibuprofen / Aleve in addition to your prescribed pain medications after surgery.    Smoking/Tobacco:  Tobacco use is known to interfere with wound and fracture healing and increase post-operative pain. It can also increase your risk of poor outcomes following surgery. Please do not use tobacco or nicotine products following your surgery. This includes smokeless tobacco, or nicotine replacement products (patches, gum, etc.).    Driving:  It is not advisable to operate a vehicle while using narcotic pain medications. Additionally, please be advised that you are likely to have challenges operating a car or motorcycle while you are still in your postoperative dressing and this could increase your risk of being involved in an accident and being cited for driving while physically impaired.     Warning Signs:  Observe your arm/hand and incision site (if visible) for increased redness, inflammation, drainage, odor or pain that is unrelieved by rest, elevation or medication. Please contact your surgeon's office immediately if you develop any of these issues or if you develop a  fever greater than 101°.    Follow Up Appointments:  Your post-operative appointment has been scheduled for 9/3/24 at 1:15 pm     [facility], [address] [facility], [place], [place], [address]

## 2024-09-03 ENCOUNTER — OFFICE VISIT (OUTPATIENT)
Dept: ORTHOPEDIC SURGERY | Facility: HOSPITAL | Age: 39
End: 2024-09-03
Payer: COMMERCIAL

## 2024-09-03 ENCOUNTER — TREATMENT (OUTPATIENT)
Dept: OCCUPATIONAL THERAPY | Facility: HOSPITAL | Age: 39
End: 2024-09-03
Payer: COMMERCIAL

## 2024-09-03 VITALS — WEIGHT: 155 LBS | HEIGHT: 67 IN | BODY MASS INDEX: 24.33 KG/M2

## 2024-09-03 DIAGNOSIS — S56.021A LACERATION OF FLEXOR TENDON OF RIGHT THUMB: Primary | ICD-10-CM

## 2024-09-03 PROCEDURE — L3808 WHFO, RIGID W/O JOINTS: HCPCS | Performed by: OCCUPATIONAL THERAPIST

## 2024-09-03 PROCEDURE — 99211 OFF/OP EST MAY X REQ PHY/QHP: CPT | Performed by: ORTHOPAEDIC SURGERY

## 2024-09-03 ASSESSMENT — PAIN - FUNCTIONAL ASSESSMENT: PAIN_FUNCTIONAL_ASSESSMENT: 0-10

## 2024-09-03 ASSESSMENT — PAIN SCALES - GENERAL: PAINLEVEL_OUTOF10: 5 - MODERATE PAIN

## 2024-09-03 ASSESSMENT — PAIN DESCRIPTION - DESCRIPTORS: DESCRIPTORS: ACHING;SORE

## 2024-09-03 NOTE — PROGRESS NOTES
Occupational Therapy  Occupational Therapy Orthosis Evaluation    Patient Name: Bubba Lew  MRN: [id number]  Today's Date: 9/3/2024     Insurance:  Visit number: 1   Time:  Time Calculation  Start Time: 1400  Stop Time: 1420  Time Calculation (min): 20 min     Insurance Type: Payor: [employer] / Plan: [employer] / Product Type: *No Product type* /     General:  Reason for visit: R thumb  Referred by: Dr. Montilla    Current Problem  1. Laceration of flexor tendon of right thumb          Precautions: AAROM/PROM     Medical History Form: Reviewed (scanned into chart)    Subjective:   Chief Complaint: R thumb  HOOD: Ongoing  DOI/DOS: 08/29/2024: Right thumb exploration with excision of ruptured FPL tendon, insertion Xiang passive keely, pulley reconstruction using autograft tendon     Hand Dominance: Right    Current Condition since injury:   same     PAIN     Moderate pain in thumb       Patients Living Environment: Reviewed and no concern    Primary Language: English         Red Flags: Do you have any of the following? No  Fever/chills, unexplained weight changes, dizziness/fainting, unexplained change in bowel or bladder functions, unexplained malaise or muscle weakness, night pain/sweats, numbness or tingling      Physical Observation: sutures intact  Edema: moderate    EDUCATION: home exercise program, plan of care, activity modifications, pain management, and injury pathology       Goals:  Active       OT Goals       OT Goal 1       Start:  05/02/24    Expected End:  05/30/24       Patient to report compliance with orthosis wear schedule to protect surgical report and improve functional use of R thumb.         OT Goal 2       Start:  05/02/24    Expected End:  05/30/24       Achieve composite fist for manipulating objects in hand.         OT Goal 3       Start:  05/02/24    Expected End:  06/27/24       Improve Quick DASH to 15%.         OT Goal 4       Start:  05/02/24    Expected End:   06/27/24       Improve active thumb IP flexion to 60 degrees for handwriting.         OT Goal 5       Start:  05/02/24    Expected End:  06/27/24       Report max pain of 2/10 for work tasks.             Plan of care was developed with input and agreement by the patient    Treatments:     Orthosis: ()    20 min  Therapsit fabricated forearm base thumb spica with MCP slightly flexed and IP at 0 degrees.      Assessment: Patient is a 37 yo male  s/p right thumb exploration with excision of ruptured FPL tendon, insertion Xiang passive keely, pulley reconstruction using autograft tendon  resulting in limited participation in pain-free ADLs and inability to perform at their prior level of function. Pt would benefit from occupational therapy to address the impairments found & listed previously in the objective section in order to return to safe and pain-free ADLs and prior level of function.       Plan:       Patient to follow up with formal evaluation next visit.      Abdoulaye East, OT

## 2024-09-10 ENCOUNTER — EVALUATION (OUTPATIENT)
Dept: OCCUPATIONAL THERAPY | Facility: HOSPITAL | Age: 39
End: 2024-09-10
Payer: COMMERCIAL

## 2024-09-10 ENCOUNTER — TELEPHONE (OUTPATIENT)
Dept: OCCUPATIONAL THERAPY | Facility: HOSPITAL | Age: 39
End: 2024-09-10
Payer: COMMERCIAL

## 2024-09-10 DIAGNOSIS — S56.021A LACERATION OF FLEXOR TENDON OF RIGHT THUMB: ICD-10-CM

## 2024-09-10 PROCEDURE — 97165 OT EVAL LOW COMPLEX 30 MIN: CPT | Mod: GO | Performed by: OCCUPATIONAL THERAPIST

## 2024-09-10 PROCEDURE — 97140 MANUAL THERAPY 1/> REGIONS: CPT | Mod: GO | Performed by: OCCUPATIONAL THERAPIST

## 2024-09-10 ASSESSMENT — ENCOUNTER SYMPTOMS
LOSS OF SENSATION IN FEET: 0
OCCASIONAL FEELINGS OF UNSTEADINESS: 0
DEPRESSION: 0

## 2024-09-10 NOTE — TELEPHONE ENCOUNTER
Called and lvm at Dr. Montilla's office, [facility] regarding getting the end date on C9 extended.  Pt. Just getting eval on 9/10/24.  Current end date is 9/30/24.

## 2024-09-10 NOTE — PROGRESS NOTES
Occupational Therapy  Occupational Therapy Orthopedic Evaluation    Patient Name: Bubba Lew  MRN: [id number]  Today's Date: 9/10/2024       Insurance:  Visit number: 1 of 12  Time:  Time Calculation  Start Time: 1330  Stop Time: 1405  Time Calculation (min): 35 min  OT Evaluation Time Entry  OT Evaluation (Low) Time Entry: 20  OT Therapeutic Procedures Time Entry  Manual Therapy Time Entry: 10  Therapeutic Exercise Time Entry: 5    Insurance Type: Payor: [employer] / Plan: [employer] / Product Type: *No Product type* /       General:  Reason for visit: R thumb  Referred by: Dr. Montilla    Current Problem  1. Laceration of flexor tendon of right thumb  Referral to Occupational Therapy          Precautions: per post op protocol       Medical History Form: Reviewed (scanned into chart)    Subjective:   Chief Complaint: R thumb  HOOD: ongoing  DOI/DOS: 08/29/2024: Right thumb exploration with excision of ruptured FPL tendon, insertion Xiang passive keely, pulley reconstruction using autograft tendon       Hand Dominance: Right    Current Condition since injury:   same     PAIN     Location: volar aspect of thumb  Intensity: 0/10 at rest, up to 8/10  Description: sharp    Relevant Information (PMH & Previous Tests/Imaging): reviewed in chart    Prior Level of Function (PLOF)  Exercise/Physical Activity: riding snowmobile   Work/School:   Current ADL/IADL Status: independent with homemaking     Patients Living Environment: Reviewed and no concern    Primary Language: English    Pt goals for therapy: improve functional use of thumb for opening jars, riding snowmobile, work tasks, cooking, cleaning, dressing and various household chores    Red Flags: Do you have any of the following? No  Fever/chills, unexplained weight changes, dizziness/fainting, unexplained change in bowel or bladder functions, unexplained malaise or muscle weakness, night pain/sweats, numbness or  tingling    Objective:    Passive thumb extension/flexion  MCP: 15-40  IP: 0-30      Physical Observation: moderate edema in thumb, patient presented in orthosis, sutures intact, no drainage noted     Numbness/Tingling: no complaints of numbness/tingling      Outcome Measures:  Quick DASH: 63.63    EDUCATION: home exercise program, plan of care, activity modifications, pain management, and injury pathology       Goals:  Active       OT Goals       OT Goal 1       Start:  07/10/24    Expected End:  10/08/24       Patient to report compliance with orthosis wear schedule and completion of home program to improve functional use of R hand.         OT Goal 2       Start:  09/10/24    Expected End:  10/08/24       Improve passive MCP flexion to 55 degrees for grasping objects in hand.         OT Goal 3       Start:  09/10/24    Expected End:  11/05/24       Improve Quick DASH to 15%.         OT Goal 4       Start:  09/10/24    Expected End:  11/05/24       Improve passive thumb IP flexion to 60 degrees for handwriting.         OT Goal 5       Start:  09/10/24    Expected End:  11/05/24       Report max pain of 2/10 for work tasks.             Plan of care was developed with input and agreement by the patient    Treatments:     Low complexity evaluation  20 min      Therapeutic Exercise:   5 min  HEP education and completion : thumb IP flexion stretch, thumb MCP flexion stretch, composite thumb flexion stretch, and self soft tissue mobilization.  Patient was educated to keep incision site clean and avoidance of well water along incision site.  Patient wrap thumb with gauze. Patient educated on gauze and coban wrapping at night to decrease edema.    Manual Therapy:     10 min  Therapist performed manual soft tissue mobilization, scar mobilization, and thumb flexion stretches.     Good rehab potential    Assessment: Patient is a 37 yo male  s/p R thumb surgery resulting in limited participation in pain-free ADLs and inability  to perform at their prior level of function. Pt would benefit from occupational therapy to address the impairments found & listed previously in the objective section in order to return to safe and pain-free ADLs and prior level of function. Session abbreviated due to patient arriving late to appointment.        Plan:      Planned Interventions include: therapeutic exercise, therapeutic activity, self-care home management, manual therapy, therapeutic activities, gait training, neuromuscular coordination, vasopneumatic, dry needling, aquatic therapy, electric stimulation, fluidotherapy, ultrasound, kinesiotaping, orthosis fabrication, wound care  Frequency: 1-2 x Week  Duration: 8 Weeks      Abdoulaye East, OT

## 2024-09-12 NOTE — PROGRESS NOTES
[facility]  Hand and Upper Extremity Service  Post Operative visit         Date of surgery: 8/29/24    Surgery(s) performed: Right thumb exploration with stage I reconstruction        Subjective report: Second postoperative visit. Overall doing well. He removed sutures in the past week.        Exam findings: Right hand reveals healed surgical wound. Subcutaneous scarring in the surgical field and circumferential swelling of the thumb. Pretty good early passive range of motion of thumb with regards to flexion.         Radiograph findings: No new images obtained       Impression: FPL tendon laceration with repair rupture       Plan: He'll continue with his activity restrictions and focusing on edema control and scar softening while maintaining passive range of motion. He'll return in 6 weeks for repeat clinical examination and we'll reevaluate his soft tissue envelope to see if he's ready to schedule for his second stage of reconstruction which will involve tendon grafting with palmaris longus tendon. Timeframe between stage 1 and stage 2 is about 3 months. We'll make decisions at his next visit regarding stage 2.        Follow Up: 6 weeks             Yves Montilla MD    [facility] [employer]  Department of Orthopaedic Surgery  Chief of Hand and Upper Extremity Surgery  [facility]    Scribe Attestation  By signing my name below, IJerald Scribe   attest that this documentation has been prepared under the direction and in the presence of Dr. Yves Montilla.      Dictation performed with the use of voice recognition software. Syntax and grammatical errors may exist.

## 2024-09-17 ENCOUNTER — APPOINTMENT (OUTPATIENT)
Dept: OCCUPATIONAL THERAPY | Facility: HOSPITAL | Age: 39
End: 2024-09-17
Payer: COMMERCIAL

## 2024-09-17 ENCOUNTER — OFFICE VISIT (OUTPATIENT)
Dept: ORTHOPEDIC SURGERY | Facility: HOSPITAL | Age: 39
End: 2024-09-17
Payer: COMMERCIAL

## 2024-09-17 ENCOUNTER — DOCUMENTATION (OUTPATIENT)
Dept: OCCUPATIONAL THERAPY | Facility: HOSPITAL | Age: 39
End: 2024-09-17
Payer: COMMERCIAL

## 2024-09-17 VITALS — HEIGHT: 67 IN | BODY MASS INDEX: 24.33 KG/M2 | WEIGHT: 155 LBS

## 2024-09-17 DIAGNOSIS — S56.021A LACERATION OF FLEXOR TENDON OF RIGHT THUMB: Primary | ICD-10-CM

## 2024-09-17 PROCEDURE — 99211 OFF/OP EST MAY X REQ PHY/QHP: CPT | Performed by: ORTHOPAEDIC SURGERY

## 2024-09-17 ASSESSMENT — PAIN - FUNCTIONAL ASSESSMENT: PAIN_FUNCTIONAL_ASSESSMENT: NO/DENIES PAIN

## 2024-09-17 NOTE — PROGRESS NOTES
Occupational Therapy                 Therapy Communication Note    Patient Name: Bubba Lew  MRN: [id number]  Department:   Room: Room/bed info not found  Today's Date: 9/17/2024     Discipline: Occupational Therapy    Missed Visit Reason:      Missed Time: No Show    Comment:

## 2024-09-24 ENCOUNTER — DOCUMENTATION (OUTPATIENT)
Dept: OCCUPATIONAL THERAPY | Facility: HOSPITAL | Age: 39
End: 2024-09-24
Payer: COMMERCIAL

## 2024-09-24 NOTE — PROGRESS NOTES
Occupational Therapy                 Therapy Communication Note    Patient Name: Bubba Lew  MRN: [id number]  Department:   Room: Room/bed info not found  Today's Date: 9/24/2024     Discipline: Occupational Therapy    Missed Visit Reason:      Missed Time: No Show    Comment:

## 2024-10-29 ENCOUNTER — OFFICE VISIT (OUTPATIENT)
Dept: ORTHOPEDIC SURGERY | Facility: HOSPITAL | Age: 39
End: 2024-10-29
Payer: COMMERCIAL

## 2024-10-29 VITALS — WEIGHT: 155 LBS | HEIGHT: 67 IN | BODY MASS INDEX: 24.33 KG/M2

## 2024-10-29 DIAGNOSIS — S56.021A LACERATION OF FLEXOR TENDON OF RIGHT THUMB: Primary | ICD-10-CM

## 2024-10-29 PROCEDURE — 99211 OFF/OP EST MAY X REQ PHY/QHP: CPT | Performed by: ORTHOPAEDIC SURGERY

## 2024-10-29 ASSESSMENT — PAIN - FUNCTIONAL ASSESSMENT: PAIN_FUNCTIONAL_ASSESSMENT: NO/DENIES PAIN

## 2024-11-22 DIAGNOSIS — S56.021A: ICD-10-CM

## 2024-11-27 ENCOUNTER — ANESTHESIA EVENT (OUTPATIENT)
Dept: OPERATING ROOM | Facility: CLINIC | Age: 39
End: 2024-11-27
Payer: COMMERCIAL

## 2024-11-29 ENCOUNTER — HOSPITAL ENCOUNTER (OUTPATIENT)
Facility: CLINIC | Age: 39
Setting detail: OUTPATIENT SURGERY
Discharge: HOME | End: 2024-11-29
Attending: ORTHOPAEDIC SURGERY | Admitting: ORTHOPAEDIC SURGERY
Payer: COMMERCIAL

## 2024-11-29 ENCOUNTER — ANESTHESIA (OUTPATIENT)
Dept: OPERATING ROOM | Facility: CLINIC | Age: 39
End: 2024-11-29
Payer: COMMERCIAL

## 2024-11-29 VITALS
TEMPERATURE: 97.2 F | RESPIRATION RATE: 16 BRPM | WEIGHT: 178.13 LBS | HEART RATE: 61 BPM | DIASTOLIC BLOOD PRESSURE: 76 MMHG | HEIGHT: 67 IN | SYSTOLIC BLOOD PRESSURE: 121 MMHG | OXYGEN SATURATION: 97 % | BODY MASS INDEX: 27.96 KG/M2

## 2024-11-29 DIAGNOSIS — S56.021A LACERATION OF FLEXOR TENDON OF RIGHT THUMB: ICD-10-CM

## 2024-11-29 DIAGNOSIS — S56.021A: Primary | ICD-10-CM

## 2024-11-29 PROCEDURE — 7100000009 HC PHASE TWO TIME - INITIAL BASE CHARGE: Performed by: ORTHOPAEDIC SURGERY

## 2024-11-29 PROCEDURE — 3600000007 HC OR TIME - EACH INCREMENTAL 1 MINUTE - PROCEDURE LEVEL TWO: Performed by: ORTHOPAEDIC SURGERY

## 2024-11-29 PROCEDURE — 7100000002 HC RECOVERY ROOM TIME - EACH INCREMENTAL 1 MINUTE: Performed by: ORTHOPAEDIC SURGERY

## 2024-11-29 PROCEDURE — 2500000004 HC RX 250 GENERAL PHARMACY W/ HCPCS (ALT 636 FOR OP/ED)

## 2024-11-29 PROCEDURE — 2500000001 HC RX 250 WO HCPCS SELF ADMINISTERED DRUGS (ALT 637 FOR MEDICARE OP): Performed by: ANESTHESIOLOGY

## 2024-11-29 PROCEDURE — 2500000004 HC RX 250 GENERAL PHARMACY W/ HCPCS (ALT 636 FOR OP/ED): Performed by: ORTHOPAEDIC SURGERY

## 2024-11-29 PROCEDURE — 2500000004 HC RX 250 GENERAL PHARMACY W/ HCPCS (ALT 636 FOR OP/ED): Performed by: ANESTHESIOLOGY

## 2024-11-29 PROCEDURE — 3600000002 HC OR TIME - INITIAL BASE CHARGE - PROCEDURE LEVEL TWO: Performed by: ORTHOPAEDIC SURGERY

## 2024-11-29 PROCEDURE — 7100000010 HC PHASE TWO TIME - EACH INCREMENTAL 1 MINUTE: Performed by: ORTHOPAEDIC SURGERY

## 2024-11-29 PROCEDURE — 7100000001 HC RECOVERY ROOM TIME - INITIAL BASE CHARGE: Performed by: ORTHOPAEDIC SURGERY

## 2024-11-29 PROCEDURE — 26392 REPAIR/GRAFT HAND TENDON: CPT | Performed by: ORTHOPAEDIC SURGERY

## 2024-11-29 PROCEDURE — 3700000001 HC GENERAL ANESTHESIA TIME - INITIAL BASE CHARGE: Performed by: ORTHOPAEDIC SURGERY

## 2024-11-29 PROCEDURE — 3700000002 HC GENERAL ANESTHESIA TIME - EACH INCREMENTAL 1 MINUTE: Performed by: ORTHOPAEDIC SURGERY

## 2024-11-29 PROCEDURE — 2500000005 HC RX 250 GENERAL PHARMACY W/O HCPCS: Performed by: ORTHOPAEDIC SURGERY

## 2024-11-29 RX ORDER — SODIUM CHLORIDE, SODIUM LACTATE, POTASSIUM CHLORIDE, CALCIUM CHLORIDE 600; 310; 30; 20 MG/100ML; MG/100ML; MG/100ML; MG/100ML
100 INJECTION, SOLUTION INTRAVENOUS CONTINUOUS
Status: SHIPPED | OUTPATIENT
Start: 2024-11-29 | End: 2024-11-29

## 2024-11-29 RX ORDER — SODIUM CHLORIDE 0.9 G/100ML
IRRIGANT IRRIGATION AS NEEDED
Status: DISCONTINUED | OUTPATIENT
Start: 2024-11-29 | End: 2024-11-29 | Stop reason: HOSPADM

## 2024-11-29 RX ORDER — ONDANSETRON HYDROCHLORIDE 2 MG/ML
4 INJECTION, SOLUTION INTRAVENOUS ONCE AS NEEDED
Status: DISCONTINUED | OUTPATIENT
Start: 2024-11-29 | End: 2024-11-29 | Stop reason: HOSPADM

## 2024-11-29 RX ORDER — FENTANYL CITRATE 50 UG/ML
50 INJECTION, SOLUTION INTRAMUSCULAR; INTRAVENOUS ONCE
Status: COMPLETED | OUTPATIENT
Start: 2024-11-29 | End: 2024-11-29

## 2024-11-29 RX ORDER — ONDANSETRON HYDROCHLORIDE 2 MG/ML
INJECTION, SOLUTION INTRAVENOUS AS NEEDED
Status: DISCONTINUED | OUTPATIENT
Start: 2024-11-29 | End: 2024-11-29

## 2024-11-29 RX ORDER — HYDROMORPHONE HYDROCHLORIDE 1 MG/ML
0.2 INJECTION, SOLUTION INTRAMUSCULAR; INTRAVENOUS; SUBCUTANEOUS EVERY 5 MIN PRN
Status: DISCONTINUED | OUTPATIENT
Start: 2024-11-29 | End: 2024-11-29 | Stop reason: HOSPADM

## 2024-11-29 RX ORDER — OXYCODONE HYDROCHLORIDE 5 MG/1
5 TABLET ORAL EVERY 6 HOURS PRN
Qty: 28 TABLET | Refills: 0 | Status: SHIPPED | OUTPATIENT
Start: 2024-11-29 | End: 2024-12-06

## 2024-11-29 RX ORDER — PROPOFOL 10 MG/ML
INJECTION, EMULSION INTRAVENOUS AS NEEDED
Status: DISCONTINUED | OUTPATIENT
Start: 2024-11-29 | End: 2024-11-29

## 2024-11-29 RX ORDER — DIPHENHYDRAMINE HYDROCHLORIDE 50 MG/ML
12.5 INJECTION INTRAMUSCULAR; INTRAVENOUS ONCE AS NEEDED
Status: DISCONTINUED | OUTPATIENT
Start: 2024-11-29 | End: 2024-11-29 | Stop reason: HOSPADM

## 2024-11-29 RX ORDER — LIDOCAINE HYDROCHLORIDE 20 MG/ML
INJECTION, SOLUTION INFILTRATION; PERINEURAL AS NEEDED
Status: DISCONTINUED | OUTPATIENT
Start: 2024-11-29 | End: 2024-11-29

## 2024-11-29 RX ORDER — KETOROLAC TROMETHAMINE 30 MG/ML
INJECTION, SOLUTION INTRAMUSCULAR; INTRAVENOUS AS NEEDED
Status: DISCONTINUED | OUTPATIENT
Start: 2024-11-29 | End: 2024-11-29

## 2024-11-29 RX ORDER — HYDROMORPHONE HYDROCHLORIDE 1 MG/ML
0.5 INJECTION, SOLUTION INTRAMUSCULAR; INTRAVENOUS; SUBCUTANEOUS EVERY 5 MIN PRN
Status: DISCONTINUED | OUTPATIENT
Start: 2024-11-29 | End: 2024-11-29 | Stop reason: HOSPADM

## 2024-11-29 RX ORDER — ALBUTEROL SULFATE 0.83 MG/ML
2.5 SOLUTION RESPIRATORY (INHALATION) ONCE AS NEEDED
Status: DISCONTINUED | OUTPATIENT
Start: 2024-11-29 | End: 2024-11-29 | Stop reason: HOSPADM

## 2024-11-29 RX ORDER — ACETAMINOPHEN 325 MG/1
650 TABLET ORAL EVERY 4 HOURS PRN
Status: DISCONTINUED | OUTPATIENT
Start: 2024-11-29 | End: 2024-11-29 | Stop reason: HOSPADM

## 2024-11-29 RX ORDER — MIDAZOLAM HYDROCHLORIDE 1 MG/ML
INJECTION, SOLUTION INTRAMUSCULAR; INTRAVENOUS AS NEEDED
Status: DISCONTINUED | OUTPATIENT
Start: 2024-11-29 | End: 2024-11-29

## 2024-11-29 RX ORDER — OXYCODONE HYDROCHLORIDE 5 MG/1
5 TABLET ORAL EVERY 4 HOURS PRN
Status: DISCONTINUED | OUTPATIENT
Start: 2024-11-29 | End: 2024-11-29 | Stop reason: HOSPADM

## 2024-11-29 RX ORDER — LIDOCAINE HYDROCHLORIDE 10 MG/ML
0.1 INJECTION, SOLUTION EPIDURAL; INFILTRATION; INTRACAUDAL; PERINEURAL ONCE
Status: DISCONTINUED | OUTPATIENT
Start: 2024-11-29 | End: 2024-11-29 | Stop reason: HOSPADM

## 2024-11-29 RX ORDER — LABETALOL HYDROCHLORIDE 5 MG/ML
5 INJECTION, SOLUTION INTRAVENOUS ONCE AS NEEDED
Status: DISCONTINUED | OUTPATIENT
Start: 2024-11-29 | End: 2024-11-29 | Stop reason: HOSPADM

## 2024-11-29 RX ORDER — FENTANYL CITRATE 50 UG/ML
INJECTION, SOLUTION INTRAMUSCULAR; INTRAVENOUS AS NEEDED
Status: DISCONTINUED | OUTPATIENT
Start: 2024-11-29 | End: 2024-11-29

## 2024-11-29 RX ORDER — CEFAZOLIN 1 G/1
INJECTION, POWDER, FOR SOLUTION INTRAVENOUS AS NEEDED
Status: DISCONTINUED | OUTPATIENT
Start: 2024-11-29 | End: 2024-11-29

## 2024-11-29 RX ADMIN — FENTANYL CITRATE 50 MCG: 0.05 INJECTION, SOLUTION INTRAMUSCULAR; INTRAVENOUS at 11:34

## 2024-11-29 RX ADMIN — OXYCODONE HYDROCHLORIDE 5 MG: 5 TABLET ORAL at 11:03

## 2024-11-29 RX ADMIN — ACETAMINOPHEN 650 MG: 325 TABLET ORAL at 11:34

## 2024-11-29 RX ADMIN — HYDROMORPHONE HYDROCHLORIDE 0.5 MG: 1 INJECTION, SOLUTION INTRAMUSCULAR; INTRAVENOUS; SUBCUTANEOUS at 10:57

## 2024-11-29 RX ADMIN — HYDROMORPHONE HYDROCHLORIDE 0.5 MG: 1 INJECTION, SOLUTION INTRAMUSCULAR; INTRAVENOUS; SUBCUTANEOUS at 10:51

## 2024-11-29 RX ADMIN — OXYCODONE HYDROCHLORIDE 5 MG: 5 TABLET ORAL at 11:33

## 2024-11-29 SDOH — HEALTH STABILITY: MENTAL HEALTH: CURRENT SMOKER: 0

## 2024-11-29 ASSESSMENT — PAIN - FUNCTIONAL ASSESSMENT
PAIN_FUNCTIONAL_ASSESSMENT: 0-10

## 2024-11-29 ASSESSMENT — PAIN SCALES - GENERAL
PAINLEVEL_OUTOF10: 10 - WORST POSSIBLE PAIN
PAINLEVEL_OUTOF10: 10 - WORST POSSIBLE PAIN
PAINLEVEL_OUTOF10: 9
PAINLEVEL_OUTOF10: 0 - NO PAIN

## 2024-11-29 NOTE — DISCHARGE INSTRUCTIONS
Post-Operative Instructions  Dr. Yves Montilla [phone]    Dressing:  You have a bandage or splint covering your operative site.    Do not remove the dressing until your next scheduled appointment with your surgeon or therapist. Keep your dressing clean and dry. The dressing will be removed at that appointment.     Post Anesthesia Instructions:  If you received general anesthesia or IV sedation for your procedure for the next 24 hours: No driving, no drinking alcohol, no sleeping aids, no important decision making, and have an adult with you for 24 hours.    Showering:  You may shower following surgery but please adhere to above instructions regarding the dressing. If showering with bandage/splint in place please ensure that it is kept dry and covered while bathing. There are commercially available cast bags that can be used to protect your dressing while showering. If using garbage bags please make sure that there are no holes in the bag and that the bag has been sealed above the dressing. If the bandage gets wet you must contact your surgeon's office to make arrangements to be seen to have the bandage changed.     Ice/Elevation:  The application of ice to your surgical site after surgery will help with pain control and swelling. This can be very effective for 48-72 hours after surgery. Please be careful to avoid getting bandage wet from a leaky ice bag. Please be advised that the ice may need to be applied for longer periods of time for the cooling effect to penetrate the post-operative dressing.     Elevation of the operative site above the level of the heart as much as possible for the first 48-72 hours after surgery. Use your sling if you have been provided one while standing or walking. If your fingers are not included in the dressing you are encouraged to attempt finger range of motion as this will help with your hand swelling and ultimate recovery.    Pain Medication:  If you received a regional  anesthetic on the day of your surgery your arm and hand may be numb for up to 24 hours after surgery. It is important to wear your sling while the block is still effective in order to protect your arm. It is advisable to take pain medications prior to going to sleep in case the regional anesthesia medication wears off while you are sleeping.    Take your pain medications as directed. Narcotic pain medications can cause lethargy, nausea and constipation. Please contact your surgeon's office and discontinue the medication if these symptoms become problematic. Eating a regular diet, drinking fluids and walking can help with constipation from these medications. Avoid alcohol consumption and driving while taking narcotic pain medications.     Additional pain control options:     You are encouraged to take over the counter medications like Advil / Motrin / Ibuprofen / Aleve in addition to your prescribed pain medications after surgery.    Smoking/Tobacco:  Tobacco use is known to interfere with wound and fracture healing and increase post-operative pain. It can also increase your risk of poor outcomes following surgery. Please do not use tobacco or nicotine products following your surgery. This includes smokeless tobacco, or nicotine replacement products (patches, gum, etc.).    Driving:  It is not advisable to operate a vehicle while using narcotic pain medications. Additionally, please be advised that you are likely to have challenges operating a car or motorcycle while you are still in your postoperative dressing and this could increase your risk of being involved in an accident and being cited for driving while physically impaired.     Warning Signs:  Observe your arm/hand and incision site (if visible) for increased redness, inflammation, drainage, odor or pain that is unrelieved by rest, elevation or medication. Please contact your surgeon's office immediately if you develop any of these issues or if you develop a  fever greater than 101°.    Follow Up Appointments:  Your post-operative appointment has been scheduled for 12/2/2024 at 11 am    [facility], [address] [place], [place], [address]

## 2024-11-29 NOTE — H&P
"History Of Present Illness  Bubba Lew is a 38 y.o. male presenting with right thumb FPL tendon injury.     Past Medical History  Past Medical History:   Diagnosis Date    Asthma     Back pain     Congenital spondylolisthesis     Fibromyalgia, primary     Laceration of flexor tendon of right thumb     Tonsillitis        Surgical History  Past Surgical History:   Procedure Laterality Date    HAND SURGERY Right     TONSILLECTOMY          Social History  He reports that he has never smoked. He has never been exposed to tobacco smoke. His smokeless tobacco use includes chew. He reports that he does not currently use alcohol. He reports that he does not currently use drugs.    Family History  No family history on file.     Allergies  Penicillins    Review of Systems   All other systems reviewed and are negative.       Physical Exam  Physical Examination Findings:  Constitutional: Appears well-developed and well-nourished.  Head: Normocephalic and atraumatic.  Eyes: Pupils are equal and round.  Cardiovascular: Intact distal pulses.   Respiratory: Effort normal. No respiratory distress.  Neurologic: Alert and oriented to person, place, and time.  Skin: Skin is warm and dry.  Hematologic / Lympahtic: No lymphedema, lymphangitis.  Psychiatric: normal mood and affect. Behavior is normal.   Musculoskeletal: Right thumb with healed surgical wounds.  Reestablished full passive flexion.  Palmaris longus present on exam.   Last Recorded Vitals  Blood pressure (!) 145/98, pulse 77, temperature 36.4 °C (97.5 °F), temperature source Temporal, resp. rate 16, height 1.702 m (5' 7\"), weight 80.8 kg (178 lb 2.1 oz), SpO2 99%.  Assessment/Plan   Assessment & Plan  Laceration of flexor muscle, fascia and tendon of right thumb at forearm level, initial encounter      Will proceed with stage II flexor tendon reconstruction right thumb as scheduled       Yves Montilla MD    "

## 2024-11-29 NOTE — ANESTHESIA POSTPROCEDURE EVALUATION
Patient: Bubba Lew    Procedure Summary       Date: 11/29/24 Room / Location: [facility] / [facility]    Anesthesia Start: 0854 Anesthesia Stop: 1026    Procedure: Stage II FPL tendon reconstruction right thumb / 60 Minutes (Right: Thumb) Diagnosis:       Laceration of flexor muscle, fascia and tendon of right thumb at forearm level, initial encounter      (Laceration of flexor muscle, fascia and tendon of right thumb at forearm level, initial encounter [S56.021A])    Surgeons: Yves Montilla MD Responsible Provider: Niharika Ford MD    Anesthesia Type: general ASA Status: 2            Anesthesia Type: general    Vitals Value Taken Time   /88 11/29/24 1040   Temp 36.2 °C (97.2 °F) 11/29/24 1025   Pulse 68 11/29/24 1040   Resp 16 11/29/24 1040   SpO2 100 % 11/29/24 1040       Anesthesia Post Evaluation    Patient participation: complete - patient participated  Level of consciousness: awake and alert  Pain management: satisfactory to patient  Airway patency: patent  Cardiovascular status: acceptable  Respiratory status: acceptable  Hydration status: acceptable  Postoperative Nausea and Vomiting: none    There were no known notable events for this encounter.

## 2024-11-29 NOTE — ANESTHESIA PROCEDURE NOTES
Airway  Date/Time: 11/29/2024 9:08 AM  Urgency: elective    Airway not difficult    Staffing  Performed: CAA   Authorized by: Niharika Ford MD    Performed by: CASH Chiang  Patient location during procedure: OR    Indications and Patient Condition  Indications for airway management: anesthesia  Spontaneous Ventilation: absent  Sedation level: deep  Preoxygenated: yes  Mask difficulty assessment: 1 - vent by mask    Final Airway Details  Final airway type: supraglottic airway      Successful airway: classic  Size 4     Number of attempts at approach: 1

## 2024-11-29 NOTE — ANESTHESIA PREPROCEDURE EVALUATION
"Patient: Bubba Lew    Procedure Information       Date/Time: 11/29/24 0915    Procedure: Stage II FPL tendon reconstruction right thumb / 60 Minutes (Right: Thumb)    Location: [facility] OR 02 / [facility]    Surgeons: Yves Montilla MD          Vitals:    11/29/24 0838   BP: (!) 145/98   Pulse: 77   Resp: 16   Temp: 36.4 °C (97.5 °F)   SpO2: 99%       Past Surgical History:   Procedure Laterality Date   • HAND SURGERY Right    • TONSILLECTOMY       Past Medical History:   Diagnosis Date   • Asthma    • Back pain    • Congenital spondylolisthesis    • Fibromyalgia, primary    • Laceration of flexor tendon of right thumb    • Tonsillitis      No current facility-administered medications for this encounter.  Prior to Admission medications    Medication Sig Start Date End Date Taking? Authorizing Provider   albuterol sulfate (Proair Digihaler) 90 mcg/actuation aero powdr breath act w/sensor inhaler Inhale 2 puffs every 4 hours if needed.    Historical Provider, MD     Allergies   Allergen Reactions   • Penicillins Rash     States 30 yrs ago, \"red spots\"     Social History     Tobacco Use   • Smoking status: Never     Passive exposure: Never   • Smokeless tobacco: Current     Types: Chew   Substance Use Topics   • Alcohol use: Not Currently         Chemistry    No results found for: \"NA\", \"K\", \"CL\", \"CO2\", \"BUN\", \"CREATININE\", \"GLU\" No results found for: \"CALCIUM\", \"ALKPHOS\", \"AST\", \"ALT\", \"BILITOT\"       No results found for: \"WBC\", \"HGB\", \"HCT\", \"PLT\"  No results found for: \"PROTIME\", \"PTT\", \"INR\"  No results found for this or any previous visit (from the past 4464 hours).  No results found for this or any previous visit from the past 1095 days.        Relevant Problems   Pulmonary   (+) Uncomplicated asthma (HHS-HCC)       Clinical information reviewed:   Tobacco  Allergies  Meds   Med Hx  Surg Hx   Fam Hx  Soc Hx        NPO Detail:  NPO/Void Status  Date of Last Liquid: 11/28/24  Time of Last " Liquid: 2345  Date of Last Solid: 11/28/24  Time of Last Solid: 2345         Physical Exam    Airway  Mallampati: I  TM distance: >3 FB  Neck ROM: full     Cardiovascular   Rhythm: regular  Rate: normal     Dental        Pulmonary   Breath sounds clear to auscultation     Abdominal        Anesthesia Plan    History of general anesthesia?: yes  History of complications of general anesthesia?: no    ASA 2     general   (Use of chewing tobacco)  The patient is not a current smoker.    intravenous induction   Anesthetic plan and risks discussed with patient.    Plan discussed with CAA.

## 2024-11-29 NOTE — OP NOTE
Stage II FPL tendon reconstruction right thumb / 60 Minutes (R) Operative Note     Date: 2024  OR Location: [facility] OR    Name: Bubba Lew, : 1985, Age: 38 y.o., MRN: [id number], Sex: male    Diagnosis  Pre-op Diagnosis      * Laceration of flexor muscle, fascia and tendon of right thumb at forearm level, initial encounter [S56.021A] Post-op Diagnosis     * Laceration of flexor muscle, fascia and tendon of right thumb at forearm level, initial encounter [S56.021A]     Procedures  Stage II FPL tendon reconstruction right thumb / 60 Minutes  59020 - [place] RMVL SYNTH NAOMY & INSJ FLXR TDN GRF H/F EA NAOMY      Surgeons      * Yves Montilla - Primary    Resident/Fellow/Other Assistant:  Surgeons and Role:  * No surgeons found with a matching role *    Staff:   Circulator: Facundo Grijalva Person: Zeferino    Anesthesia Staff: Anesthesiologist: Niharika Ford MD  C-AA: CASH Chiang    Procedure Summary  Anesthesia: General  ASA: II  Estimated Blood Loss: 1 mL  Intra-op Medications:   Administrations occurring from 0915 to 1030 on 24:   Medication Name Total Dose   BUPivacaine HCl (Marcaine) 0.5 % (5 mg/mL) 5 mL, lidocaine (Xylocaine) 5 mL syringe 8 mL   fentaNYL PF 0.05 mg/mL 50 mcg   ketorolac (Toradol) 15 mg 30 mg   ondansetron 2 mg/mL 4 mg   propofol (Diprivan) infusion 10 mg/mL 90 mg   NaCl 0.9 % bolus Cannot be calculated              Anesthesia Record               Intraprocedure I/O Totals          Intake    NaCl 0.9 % bolus 300.00 mL    Propofol Drip 0.00 mL    The total shown is the total volume documented since Anesthesia Start was filed.    Total Intake 300 mL          Specimen: No specimens collected              Drains and/or Catheters: * None in log *    Tourniquet Times:   * Missing tourniquet times found for documented tourniquets in lo *     Implants:     Findings: Chronic FPL tendon rupture right thumb     Indications: Bubba Lew is an 38 y.o. male who is having surgery  for Laceration of flexor muscle, fascia and tendon of right thumb at forearm level, initial encounter [S56.021A].      The patient was seen in the preoperative area. The risks, benefits, complications, treatment options, non-operative alternatives, expected recovery and outcomes were discussed with the patient. The possibilities of reaction to medication, pulmonary aspiration, injury to surrounding structures, bleeding, recurrent infection, the need for additional procedures, failure to diagnose a condition, and creating a complication requiring transfusion or operation were discussed with the patient. The patient concurred with the proposed plan, giving informed consent.  The site of surgery was properly noted/marked if necessary per policy. The patient has been actively warmed in preoperative area. Preoperative antibiotics have been ordered and given within 1 hours of incision. Venous thrombosis prophylaxis have been ordered including bilateral sequential compression devices    Procedure Details:   38-year-old right-hand-dominant gentleman who sustained a work-related injury to his right thumb approximately 6 months ago resulting in a FPL tendon laceration and digital nerve injury with complex wound.  He underwent primary repair of his FPL and and digital nerve with associated full-thickness skin grafting.  He was struggling to regain active thumb IP joint flexion and was taken back to the operating room roughly 3 months ago where it was identified that he had ruptured his FPL tendon repair.  At that time the placed a synthetic tendon keely in preparation for today's procedure for stage II flexor tendon reconstruction.  Preoperatively the right hand was identified and marked for surgery.  Informed consent process was completed.    Patient was brought to the operating and placed supine on the operating table.  Timeout procedure was performed to verify correct patient, procedure and operative site.  Intravenous  antibiotics were administered.  General anesthetic initiated by the anesthesia service.  Right upper extremity prepped and draped in the usual sterile fashion.  Limb was exsanguinated and tourniquet was inflated to 250 mmHg.    Beginning at the distal aspect of the thumb we made an incision through her prior scar obliquely across the pulp distal to the IP joint flexion crease.  Here we dissected in the midline down to the FPL tendon stump and identified the tendon stump and the distal end of the previously placed synthetic tendon keely.    I then made a longitudinal incision along the radial border of the FCR tendon in the distal forearm.  We dissected between the FCR and the radial artery and encountered the retracted FPL myotendinous unit.  Tenolysis was performed to mobilize the structure.  Once this was completely mobile we exposed also the proximal extent of the synthetic tendon keely in the distal forearm.    Then made a transverse incision at the wrist flexion crease directly over the distal aspect of the palmaris longus tendon.  With a tendon stripper we harvested palmaris longus tendon graft.  The distal end of this free tendon graft was sutured to the proximal end of the synthetic tendon keely.  We then released the attachment of the tendon keely distally to the FPL stump and pulled the tendon keely out of the thumb which brought the tendon graft into the thumb.  The tendon keely was then removed.  We secured the distal aspect of the FPL tendon through transosseous tunnels into the distal phalanx.  Once this was secured we were able to demonstrate that applying tension to the tendon graft in the distal forearm reestablished thumb IP joint flexion.  Using Pulvertaft weave technique we passed the proximal end of the palmaris longus tendon graft through the distal end of the FPL myotendinous unit in the distal forearm.  We established appropriate tension and secured this Pulvertaft weave.  We then assessed our attention  and determined that we had appropriate tension on this tendon graft reconstruction.  The palmaris longus tendon graft was then woven 2 additional times through the FPL in the distal forearm.  Each of these junctions were secured with 3-0 Ethibond.  We then performed an end to side repair of the most distal aspect of the FPL tendon to the palmaris longus tendon graft and the distal aspect of the volar forearm incision.  Wounds were now all copiously irrigated and closed interrupted fashion.  Sterile bandages were applied.  Local anesthetic had been infiltrated.  The tourniquet was deflated.  The thumb was briskly reperfused.  The patient was placed into a well-padded thumb spica splint holding the thumb into position of flexion.  He was awoken from his anesthetic and transferred to the recovery in stable condition.    Postoperatively he will be discharged home once comfortable.  He will remain in his splint until he returns to clinic in 3 days.  At that time we will refer him to therapy and begin rehabilitation for this stage II flexor tendon reconstruction to his right thumb.        Complications:  None; patient tolerated the procedure well.    Disposition: PACU - hemodynamically stable.  Condition: stable                 Additional Details:      Attending Attestation: I was present and scrubbed for the entire procedure.    Yves Montilla  Phone Number: [phone]

## 2024-12-02 ENCOUNTER — EVALUATION (OUTPATIENT)
Dept: OCCUPATIONAL THERAPY | Facility: CLINIC | Age: 39
End: 2024-12-02
Payer: COMMERCIAL

## 2024-12-02 ENCOUNTER — OFFICE VISIT (OUTPATIENT)
Dept: ORTHOPEDIC SURGERY | Facility: CLINIC | Age: 39
End: 2024-12-02
Payer: COMMERCIAL

## 2024-12-02 VITALS — HEIGHT: 67 IN | WEIGHT: 178 LBS | BODY MASS INDEX: 27.94 KG/M2

## 2024-12-02 DIAGNOSIS — S56.021A LACERATION OF FLEXOR TENDON OF RIGHT THUMB: Primary | ICD-10-CM

## 2024-12-02 PROCEDURE — 99024 POSTOP FOLLOW-UP VISIT: CPT | Performed by: ORTHOPAEDIC SURGERY

## 2024-12-02 PROCEDURE — 4004F PT TOBACCO SCREEN RCVD TLK: CPT | Performed by: ORTHOPAEDIC SURGERY

## 2024-12-02 PROCEDURE — L3808 WHFO, RIGID W/O JOINTS: HCPCS | Performed by: OCCUPATIONAL THERAPIST

## 2024-12-02 PROCEDURE — 3008F BODY MASS INDEX DOCD: CPT | Performed by: ORTHOPAEDIC SURGERY

## 2024-12-02 ASSESSMENT — PAIN - FUNCTIONAL ASSESSMENT: PAIN_FUNCTIONAL_ASSESSMENT: 0-10

## 2024-12-02 ASSESSMENT — PAIN DESCRIPTION - DESCRIPTORS: DESCRIPTORS: ACHING;SORE

## 2024-12-02 ASSESSMENT — PAIN SCALES - GENERAL: PAINLEVEL_OUTOF10: 5 - MODERATE PAIN

## 2024-12-02 NOTE — PROGRESS NOTES
"  Occupational Therapy  Occupational Therapy Orthopedic Evaluation    Patient Name: Bubba Lew  MRN: [id number]  Today's Date: 12/3/2024  Time Calculation  Start Time: 1205  Stop Time: 1240  Time Calculation (min): 35 min   ,  ,      Insurance:  Visit number: 1 of 12  Authorization info: pending [facility] claim  [facility] Certification Period  -  Start: 12/2/24  End: 3/31/25   REF #[id number],   ICD-10 CODE S56.021A    Current Problem  1. Laceration of flexor tendon of right thumb  Referral to Occupational Therapy    Follow Up In Occupational Therapy          Referred by:  Dr. Montilla  Reason for visit:  Thumb spica     Subjective   Chief Complaint: Pt presents as walk-in patient for orthosis fabrication following first post operative visit with Dr. Montilla following   DOS: 11/29/24  Walk in Patient? Yes  Work Related Injury? Yes    Hand dominance: Right  Effected extremity: Left    Medical History Form: Reviewed (scanned into chart)  Prior Level of Function (PLOF)  Patient previously independent with all ADLs/IADLs    ADL/IADL Deficits:  Bathing, Dressing, Hygiene/Grooming, Hair care, Sleep, Driving, Home mgt, Meal prep, and Work      Precautions:   NWB      Pain:   7-8/10  Location: R wrist surgical site  Description: aching, dull , throbbing, sore, tight  Relieving Factors:  Elevation      Patients Living Environment: Reviewed and no concern  Lives with: alone  Primary Language: English  Patient's Goal(s) for Therapy:   \"to use my right hand again and get back to work\"      Objective           Wound:  - sutures intact  - steri strips intact    Edema:  -  min  - mod  - severe    Sensation:  - intact light touch  - hypersensitivity  - paraesthesias    Performance Impacted:   - ROM   - Joint mobility   - Strength   - Sensibility   - Pain   - Wound   - Scar    Outcome Measures:  Quick DASH: 72.73%          TODAY'S TREATMENT    Fabricated custom orthosis: Volar based thumb spica  Issued prefab orthosis none    EDUCATION: Risk/benefits " discussed, Home exercise program, orthosis wear schedule/ purpose/ precautions, care of orthosis, wound care, edema control, activity modifications, joint protection, pain/symptom management, injury pathology, and plan of care,        Orthosis purpose:  - protect  - position  - immobilize    Wear Schedule:  - At all times   - may remove for bathing/dressing/hygiene   - may remove for HEP   - during activity/work   - while sleeping    Assessment:   Pt is 38 year old male who underwent stage 2 FPL reconstruction and is presenting today for evaluation with complaints of decreased strength, decreased ROM, increased pain.  As a result of these impairments pt is having difficulty with upper body/lower body bathing and dressing tasks, difficulty with hair care tasks, is experiencing loss of sleep, and is having difficulty with home management and meal prep tasks.  Without skilled intervention patient is at risk for further loss of ROM, loss of strength, reduced ADL function, and is at risk for requiring caregiver assistance for self care completion.  Patient to benefit from skilled services to address the impairments found in order to return to independent prior level of function.      Level of Complexity  MOD complexity    OT Rehab Potential  Excellent      Plan:  Planned Interventions include: therapeutic exercise, self-care home management, manual therapy, therapeutic activities, , neuromuscular coordination, vasopneumatic, dry needling, and orthosis fabrication.  Frequency: 2 x Week  Duration: 12 Weeks      Goals - Patient will:       Goal 1) verbalize/demonstrate/understanding of diagnosis and precautions       Goal 2) verbalize purpose of orthosis, wearing schedule, care and precautions       Goal 3) don/doff orthosis correctly and independently       Goal 4) verbalize/demonstrate home program, activity modification and/or adaptive equipment    All goals set today and have been met.    Plan of care was developed with  input and agreement by the patient      Jessica Jeronimo, OT

## 2024-12-02 NOTE — PROGRESS NOTES
[facility]  Hand and Upper Extremity Service  Post Operative visit         Date of surgery: 11/29/24    Surgery(s) performed: Stage 2 FPL tendon reconstruction right thumb        Subjective report: First postoperative visit. Overall doing well and pain is well controlled.        Exam findings: Right hand reveals intact suture line. Able to demonstrate active IP joint range of motion. Fingertip is well profuse and sensation intact to light touch.        Radiograph findings: No new images obtained       Impression: Right thumb FPL laceration       Plan: He was referred to occupational therapy today for splinting and stage 2 rehab protocol. He'll return in 2 weeks for repeat clinical examination and suture removal.         Follow Up: 2 weeks             Yves Montilla MD    [facility] [employer]  Department of Orthopaedic Surgery  Chief of Hand and Upper Extremity Surgery  [facility]    Scribe Attestation  By signing my name below, I Leticiamicah Weldon , Scrazul   attest that this documentation has been prepared under the direction and in the presence of Dr. Yves Montilla.      Dictation performed with the use of voice recognition software. Syntax and grammatical errors may exist.

## 2024-12-16 NOTE — PROGRESS NOTES
[facility]  Hand and Upper Extremity Service  Post Operative visit         Date of surgery: 11/29/24    Surgery(s) performed: Stage 2 FPL tendon reconstruction right thumb        Subjective report: Second postoperative visit. He saw therapy at his last visit but hasn't done formal therapy since last visit due to scheduling conflicts. He has an appointment scheduled later this week. He's been very protective of his tendon repair and has been doing the exercises previously instructed. He is overall very happy with results.        Exam findings: Right hand reveals well healed surgical incision. Thumb tip well profuse but subjective tingling in the tip of thumb. Able to demonstrate active thumb IP joint flexion which is better when he demonstrates blocking exercises.        Radiograph findings: No new images obtained       Impression: Right thumb FPL laceration       Plan: He's going to continue with protective range of motion of thumb as previously instructed. He'll follow up with his therapist as scheduled and he needs to continue to avoid any resisted pinch or passive attempts at hyperextension. He presently remains unable to return to work. He'll return in 4 weeks for repeat clinical examination and we'll progress his activities at that time.         Follow Up: 4 weeks             Yves Montilla MD    [facility] [employer]  Department of Orthopaedic Surgery  Chief of Hand and Upper Extremity Surgery  [facility]    Scribe Attestation  By signing my name below, IJerald Scribe   attest that this documentation has been prepared under the direction and in the presence of Dr. Yves Montilla.      Dictation performed with the use of voice recognition software. Syntax and grammatical errors may exist.

## 2024-12-17 ENCOUNTER — OFFICE VISIT (OUTPATIENT)
Dept: ORTHOPEDIC SURGERY | Facility: HOSPITAL | Age: 39
End: 2024-12-17
Payer: COMMERCIAL

## 2024-12-17 VITALS — HEIGHT: 67 IN | BODY MASS INDEX: 27.94 KG/M2 | WEIGHT: 178 LBS

## 2024-12-17 DIAGNOSIS — S56.021A LACERATION OF FLEXOR TENDON OF RIGHT THUMB: Primary | ICD-10-CM

## 2024-12-17 PROCEDURE — 99211 OFF/OP EST MAY X REQ PHY/QHP: CPT | Performed by: ORTHOPAEDIC SURGERY

## 2024-12-17 ASSESSMENT — PAIN - FUNCTIONAL ASSESSMENT: PAIN_FUNCTIONAL_ASSESSMENT: NO/DENIES PAIN

## 2024-12-19 ENCOUNTER — DOCUMENTATION (OUTPATIENT)
Dept: OCCUPATIONAL THERAPY | Facility: HOSPITAL | Age: 39
End: 2024-12-19
Payer: COMMERCIAL

## 2025-01-07 ENCOUNTER — EVALUATION (OUTPATIENT)
Dept: OCCUPATIONAL THERAPY | Facility: HOSPITAL | Age: 40
End: 2025-01-07
Payer: COMMERCIAL

## 2025-01-07 DIAGNOSIS — S56.021A LACERATION OF FLEXOR TENDON OF RIGHT THUMB: ICD-10-CM

## 2025-01-07 PROCEDURE — 97110 THERAPEUTIC EXERCISES: CPT | Mod: GO | Performed by: OCCUPATIONAL THERAPIST

## 2025-01-07 PROCEDURE — 97165 OT EVAL LOW COMPLEX 30 MIN: CPT | Mod: GO | Performed by: OCCUPATIONAL THERAPIST

## 2025-01-07 ASSESSMENT — ENCOUNTER SYMPTOMS
DEPRESSION: 0
LOSS OF SENSATION IN FEET: 0
OCCASIONAL FEELINGS OF UNSTEADINESS: 0

## 2025-01-07 NOTE — PROGRESS NOTES
Occupational Therapy  Occupational Therapy Orthopedic Evaluation    Patient Name: Bubba Lew  MRN: [id number]  Today's Date: 1/7/2025       Insurance:  Visit number: 1 of 12 1/6/25 - J - 2025 [facility] / CLAIM # [id number] / DOI 4/13/24 / DX: S56.021 / OT APPROVED FOR 2X WEEK FOR 6 WEEKS FROM 11/1/24 TO 3/31/25 / C9 IN     Time:  Time Calculation  Start Time: 1535  Stop Time: 1610  Time Calculation (min): 35 min  OT Evaluation Time Entry  OT Evaluation (Low) Time Entry: 15  OT Therapeutic Procedures Time Entry  Manual Therapy Time Entry: 10  Therapeutic Exercise Time Entry: 10       Insurance Type: Payor: [employer] / Plan: [employer] / Product Type: *No Product type* /       General:  Reason for visit: R thumb  Referred by: Roldan Rodrigues    Current Problem  1. Laceration of flexor tendon of right thumb  Follow Up In Occupational Therapy        Precautions: per post op protocol     Medical History Form: Reviewed (scanned into chart)    Subjective:   Chief Complaint: R thumb  HOOD: Chronic    Date of surgery: 11/29/24  Surgery(s) performed: Stage 2 FPL tendon reconstruction right thumb     Hand Dominance: Right    Current Condition since injury:   better      PAIN     Location: volar thumb  Intensity: 1/10 up to 3/10  Description: sharp    Relevant Information (PMH & Previous Tests/Imaging): reviewed in chart       Prior Level of Function (PLOF)  Exercise/Physical Activity: n/a  Work/School:    Current ADL/IADL Status: homemaking     Patients Living Environment: Reviewed and no concern    Primary Language: English    Pt goals for therapy: improve functional use of R thumb for manipulating objects in hand, cooking, cleaning, eating, opening jars, work tasks, and various household chores.       Red Flags: Do you have any of the following? No  Fever/chills, unexplained weight changes, dizziness/fainting, unexplained change in bowel or bladder functions, unexplained  malaise or muscle weakness, night pain/sweats, numbness or tingling    Objective:    Active thumb flexion:  MCP: 40  IP: 25  Opposition to tip of 5th  Wrist extension/flexion 50/75      Physical Observation: mild edema, patient reports good compliance with splint wear schedule and completion of short arc thumb flexion     Numbness/Tingling: numbness along tip of thumb    Outcome Measures:  Quick DASH: 75    EDUCATION: home exercise program, plan of care, activity modifications, pain management, and injury pathology       Goals:  Active       OT Goals       OT Goal 1       Start:  01/07/25    Expected End:  02/04/25       Improve thumb IP flexion to 40 degrees for grasping objects in hand.          OT Goal 2       Start:  01/07/25    Expected End:  02/04/25       Patient to report compliance with home program for improved functional use of thumb.          OT Goal 3       Start:  01/07/25    Expected End:  03/04/25       Improve Quick DASH to 15%.         OT Goal 4       Start:  01/07/25    Expected End:  03/04/25       Report max pain of 2/10 for household chores.         OT Goal 5       Start:  01/07/25    Expected End:  03/04/25       Improve  strength to 75% of unaffected side for lifting heavier household objects.           Resolved       OT Goals       OT Goal 1 (Not met)       Start:  07/10/24    Expected End:  10/08/24    Resolved:  01/07/25    Patient to report compliance with orthosis wear schedule and completion of home program to improve functional use of R hand.         OT Goal 2 (Not met)       Start:  09/10/24    Expected End:  10/08/24    Resolved:  01/07/25    Improve passive MCP flexion to 55 degrees for grasping objects in hand.         OT Goal 3 (Not met)       Start:  09/10/24    Expected End:  11/05/24    Resolved:  01/07/25    Improve Quick DASH to 15%.         OT Goal 4 (Not met)       Start:  09/10/24    Expected End:  11/05/24    Resolved:  01/07/25    Improve passive thumb IP flexion to  60 degrees for handwriting.         OT Goal 5 (Not met)       Start:  09/10/24    Expected End:  11/05/24    Resolved:  01/07/25    Report max pain of 2/10 for work tasks.             Plan of care was developed with input and agreement by the patient    Treatments:       Low complexity evaluation                15 min       Therapeutic Exercise:                         10 min  HEP education and completion : short arc gentle thumb flexion and IP flexion, thumb opposition, manipulation and transfer with foam cubes     Manual Therapy:                                           10 min  Therapist performed manual thumb and digit flexion stretches: MCP flexion and IP flexion. Therapist performed manual scar mobilization.    Good rehab potential    Assessment: Patient is a 40 yo male  s/p R thumb injury resulting in limited participation in pain-free ADLs and inability to perform at their prior level of function. Pt would benefit from occupational therapy to address the impairments found & listed previously in the objective section in order to return to safe and pain-free ADLs and prior level of function.       Plan:      Planned Interventions include: therapeutic exercise, therapeutic activity, self-care home management, manual therapy, therapeutic activities, gait training, neuromuscular coordination, vasopneumatic, dry needling, aquatic therapy, electric stimulation, fluidotherapy, ultrasound, kinesiotaping, orthosis fabrication, wound care  Frequency: 1-2 x Week  Duration: 8 Weeks      Abdoulaye East, OT

## 2025-01-09 ENCOUNTER — TREATMENT (OUTPATIENT)
Dept: OCCUPATIONAL THERAPY | Facility: HOSPITAL | Age: 40
End: 2025-01-09
Payer: COMMERCIAL

## 2025-01-09 DIAGNOSIS — S56.021A LACERATION OF FLEXOR TENDON OF RIGHT THUMB: ICD-10-CM

## 2025-01-09 PROCEDURE — 97035 APP MDLTY 1+ULTRASOUND EA 15: CPT | Mod: GO | Performed by: OCCUPATIONAL THERAPIST

## 2025-01-09 PROCEDURE — 97140 MANUAL THERAPY 1/> REGIONS: CPT | Mod: GO | Performed by: OCCUPATIONAL THERAPIST

## 2025-01-09 PROCEDURE — 97110 THERAPEUTIC EXERCISES: CPT | Mod: GO | Performed by: OCCUPATIONAL THERAPIST

## 2025-01-09 NOTE — PROGRESS NOTES
Occupational Therapy  Occupational Therapy Orthopedic Treatment    Patient Name: Bubba Lew  MRN: [id number]  Today's Date: 1/9/2025       Insurance:  Visit number: 2 of 12 1/6/25 - J - 2025 [facility] / CLAIM # [id number] / DOI 4/13/24 / DX: S56.021 / OT APPROVED FOR 2X WEEK FOR 6 WEEKS FROM 11/1/24 TO 3/31/25 / C9 IN    Time:  Time Calculation  Start Time: 1300  Stop Time: 1343  Time Calculation (min): 43 min  OT Modalities Time Entry  Ultrasound Time Entry: 8  OT Therapeutic Procedures Time Entry  Manual Therapy Time Entry: 15  Therapeutic Exercise Time Entry: 15      Insurance Type: Payor: [employer] / Plan: [employer] / Product Type: *No Product type* /     General:  Reason for visit: R thumb  Referred by: Roldan Rodrigues    Current Problem  1. Laceration of flexor tendon of right thumb  Follow Up In Occupational Therapy        Precautions: per post op protocol     Medical History Form: Reviewed (scanned into chart)    Subjective: I have been working on the scar.  Chief Complaint: R thumb  HOOD: Chronic    Date of surgery: 11/29/24  Surgery(s) performed: Stage 2 FPL tendon reconstruction right thumb     Hand Dominance: Right    Current Condition since injury:   better      PAIN     Location: volar thumb  Intensity: 1/10 up to 3/10  Description: sharp    Relevant Information (PMH & Previous Tests/Imaging): reviewed in chart       Prior Level of Function (PLOF)  Exercise/Physical Activity: n/a  Work/School:    Current ADL/IADL Status: homemaking     Patients Living Environment: Reviewed and no concern    Primary Language: English    Pt goals for therapy: improve functional use of R thumb for manipulating objects in hand, cooking, cleaning, eating, opening jars, work tasks, and various household chores.       Red Flags: Do you have any of the following? No  Fever/chills, unexplained weight changes, dizziness/fainting, unexplained change in bowel or bladder  functions, unexplained malaise or muscle weakness, night pain/sweats, numbness or tingling    Objective:    Active thumb flexion:  MCP: 45 was 40  IP: 35 was 25  Opposition to tip of 5th  Wrist extension/flexion 50/75      Physical Observation: mild edema, patient reports good compliance with splint wear schedule and completion of short arc thumb flexion     Numbness/Tingling: numbness along tip of thumb    Outcome Measures:  Quick DASH: 75    EDUCATION: home exercise program, plan of care, activity modifications, pain management, and injury pathology       Goals:  Active       OT Goals       OT Goal 1       Start:  01/07/25    Expected End:  02/04/25       Improve thumb IP flexion to 40 degrees for grasping objects in hand.          OT Goal 2       Start:  01/07/25    Expected End:  02/04/25       Patient to report compliance with home program for improved functional use of thumb.          OT Goal 3       Start:  01/07/25    Expected End:  03/04/25       Improve Quick DASH to 15%.         OT Goal 4       Start:  01/07/25    Expected End:  03/04/25       Report max pain of 2/10 for household chores.         OT Goal 5       Start:  01/07/25    Expected End:  03/04/25       Improve  strength to 75% of unaffected side for lifting heavier household objects.           Resolved       OT Goals       OT Goal 1 (Not met)       Start:  07/10/24    Expected End:  10/08/24    Resolved:  01/07/25    Patient to report compliance with orthosis wear schedule and completion of home program to improve functional use of R hand.         OT Goal 2 (Not met)       Start:  09/10/24    Expected End:  10/08/24    Resolved:  01/07/25    Improve passive MCP flexion to 55 degrees for grasping objects in hand.         OT Goal 3 (Not met)       Start:  09/10/24    Expected End:  11/05/24    Resolved:  01/07/25    Improve Quick DASH to 15%.         OT Goal 4 (Not met)       Start:  09/10/24    Expected End:  11/05/24    Resolved:  01/07/25     Improve passive thumb IP flexion to 60 degrees for handwriting.         OT Goal 5 (Not met)       Start:  09/10/24    Expected End:  11/05/24    Resolved:  01/07/25    Report max pain of 2/10 for work tasks.             Plan of care was developed with input and agreement by the patient    Treatments:       Modalities                  13 min  Heating pad applied to circumferential hand.  Therapist performed ultrasound at 3.0MHz and 1.0w/cm2 for 8 min along dorsal forearm.      Therapeutic Exercise:                         15 min  HEP education and completion : short arc gentle thumb flexion and IP flexion, thumb opposition, manipulation and transfer with foam cubes  Patient performed thumb flexion with therapist applying manual scar mobilization     Manual Therapy:                                           15 min  Therapist performed manual thumb and digit flexion stretches: MCP flexion and IP flexion. Therapist performed manual scar mobilization.         Assessment:     Thumb motion improved. Patient to continue working on scar mobilization. Patient to follow up next week.     Plan:      Planned Interventions include: therapeutic exercise, therapeutic activity, self-care home management, manual therapy, therapeutic activities, gait training, neuromuscular coordination, vasopneumatic, dry needling, aquatic therapy, electric stimulation, fluidotherapy, ultrasound, kinesiotaping, orthosis fabrication, wound care  Frequency: 1-2 x Week  Duration: 8 Weeks      Abdoulaye East, OT

## 2025-01-13 NOTE — PROGRESS NOTES
[facility]  Hand and Upper Extremity Service  Post Operative visit         Date of surgery: 11/29/24    Surgery(s) performed: Stage 2 FPL tendon reconstruction right thumb        Subjective report: Third postoperative visit. He missed his therapy appointment today due to car troubles but overall he is very encouraged with his recovery. He hasn't started any strengthening yet.        Exam findings: Right hand reveals thumb held in slight MP joint flexion. Active MP and IP joint flexion of the thumb and able to bring tip of thumb across the palm to the tip of his small finger PIP joint region. Sensation intact to light touch.          Impression: Sequela of right thumb FPL laceration       Plan: Overall he's done well and we discussed he'll always have motion limitations in regards to his thumb both in regards to flexion and extension and will never be exactly the same as it was prior to injury. He's not physically capable of returning to work at this time. He'll continue with his therapy program but they can initiate gentle resisted activities and stretching. He'll return in 4-5 weeks for repeat clinical examination and we can make decisions regarding his return to work.        Follow Up: 4-5 weeks             Yves Montilla MD    [facility] [employer]  Department of Orthopaedic Surgery  Chief of Hand and Upper Extremity Surgery  [facility]    Scribe Attestation  By signing my name below, IJerald Scribe   attest that this documentation has been prepared under the direction and in the presence of Dr. Yves Montilla.      Dictation performed with the use of voice recognition software. Syntax and grammatical errors may exist.

## 2025-01-14 ENCOUNTER — OFFICE VISIT (OUTPATIENT)
Dept: ORTHOPEDIC SURGERY | Facility: HOSPITAL | Age: 40
End: 2025-01-14
Payer: COMMERCIAL

## 2025-01-14 VITALS — HEIGHT: 67 IN | WEIGHT: 178 LBS | BODY MASS INDEX: 27.94 KG/M2

## 2025-01-14 DIAGNOSIS — S56.021A LACERATION OF FLEXOR TENDON OF RIGHT THUMB: Primary | ICD-10-CM

## 2025-01-14 PROCEDURE — 99211 OFF/OP EST MAY X REQ PHY/QHP: CPT | Performed by: ORTHOPAEDIC SURGERY

## 2025-01-14 ASSESSMENT — PAIN - FUNCTIONAL ASSESSMENT: PAIN_FUNCTIONAL_ASSESSMENT: NO/DENIES PAIN

## 2025-01-16 ENCOUNTER — DOCUMENTATION (OUTPATIENT)
Dept: OCCUPATIONAL THERAPY | Facility: HOSPITAL | Age: 40
End: 2025-01-16
Payer: COMMERCIAL

## 2025-01-16 NOTE — PROGRESS NOTES
Occupational Therapy                 Therapy Communication Note    Patient Name: Bubba Lew  MRN: [id number]  Department:   Room: Room/bed info not found  Today's Date: 1/16/2025     Discipline: Occupational Therapy          Missed Visit Reason:      Missed Time: No Show    Comment: Patient discharged to home program.

## 2025-02-05 ENCOUNTER — APPOINTMENT (OUTPATIENT)
Dept: PRIMARY CARE | Facility: CLINIC | Age: 40
End: 2025-02-05
Payer: COMMERCIAL

## 2025-02-05 VITALS
HEIGHT: 67 IN | WEIGHT: 185 LBS | HEART RATE: 70 BPM | SYSTOLIC BLOOD PRESSURE: 132 MMHG | RESPIRATION RATE: 14 BRPM | BODY MASS INDEX: 29.03 KG/M2 | DIASTOLIC BLOOD PRESSURE: 66 MMHG

## 2025-02-05 DIAGNOSIS — Z00.00 ROUTINE MEDICAL EXAM: Primary | ICD-10-CM

## 2025-02-05 DIAGNOSIS — G47.00 INSOMNIA, UNSPECIFIED TYPE: ICD-10-CM

## 2025-02-05 DIAGNOSIS — E66.3 OVERWEIGHT (BMI 25.0-29.9): ICD-10-CM

## 2025-02-05 PROCEDURE — 99385 PREV VISIT NEW AGE 18-39: CPT | Performed by: FAMILY MEDICINE

## 2025-02-05 PROCEDURE — 3008F BODY MASS INDEX DOCD: CPT | Performed by: FAMILY MEDICINE

## 2025-02-05 ASSESSMENT — PATIENT HEALTH QUESTIONNAIRE - PHQ9
SUM OF ALL RESPONSES TO PHQ9 QUESTIONS 1 AND 2: 0
2. FEELING DOWN, DEPRESSED OR HOPELESS: NOT AT ALL
1. LITTLE INTEREST OR PLEASURE IN DOING THINGS: NOT AT ALL

## 2025-02-05 NOTE — PROGRESS NOTES
pt has regular dental visits. no vision problems. no hearing loss.   Lifestyle: healthy diet. no weight concerns. Pt exercises occ.   He does use tobacco. He denies alcohol abuse.   Reproductive health: the patient is not sexually active.    Safety elements used: seat belt,  smoke detector at home.   no passive smoke exposure,  chemical abuse, domestic violence, anxiety symptoms, depression symptoms, pt has safe driving habits, no driving violations,  no tuberculosis exposure.      Review of Systems  Constitutional: no chills, no fever and no night sweats.   Eyes: no eyesight problems.   ENT: no hearing loss, no nasal congestion, no nasal discharge, no hoarseness. no sore throat. occ trouble swallowing  Neck: no mass(es) and no swelling.   Cardiovascular: no chest pain, no intermittent leg claudication, no lower extremity edema, no palpitations and no syncope.   Respiratory: no cough,  no shortness of breath.   Gastrointestinal: no abdominal pain, no constipation, no blood in stools, no diarrhea, no melena, no nausea,  vomiting.   Genitourinary: no dysuria, urinary urgency, urinary frequency, hematuria, urinary hesitancy, incontinence, nocturia,  no genital lesions, no local lump.   Musculoskeletal: no back pain, no joint pain. no myalgias.   Integumentary: no new skin lesions, no rashes. no skin wound.   Neurological: no difficulty walking, no headache, no limb weakness, no numbness, no tingling.   Psychiatric: no anxiety,  no depression, no hi/si, + sleep disturbances.  no substance use disorders.   Endocrine: no changes in appetite or voice, no polyuria or polydypsia, no feelings of weakness  Hematologic/Lymphatic: no  easy bleeding, no easy bruising, no recurrent infections and no swollen glands.     Physical Exam  Constitutional: Alert and in no acute distress. Well developed, well nourished.   Head and Face: Normal.    Eyes: Normal external exam. Pupils: normal size and EOMI. No sclera icterus  Ears, Nose,  Mouth, and Throat: External inspection of ears and nose: Normal.  Hearing: Normal.  Nasal mucosa: Normal.  Oropharynx: Normal.    Neck: Supple. No neck mass was observed. Thyroid not enlarged  Cardiovascular: Heart rate and rhythm were normal. Pedal pulses: Normal. No peripheral edema.   Pulmonary: No respiratory distress. Clear bilateral breath sounds.   Chest wall: Normal.    Abdomen: Soft,  nontender; no abdominal mass palpated. No organomegaly. Normal BS.  Musculoskeletal: Gait and station: Normal. No joint effusion, Muscle strength/tone: Normal.    Skin: Normal skin color and pigmentation, normal skin turgor,  no rash.   Neurologic: Cranial nerves 2-12 grossly intact.  Sensation: Normal.   Psychiatric: Judgment and insight: Intact. Alert and oriented x 3. Recent and remote memory: Normal.  Mood and affect: Normal.   Lymphatic: No cervical lymphadenopathy.     Unremarkable PE except overweight. recommend nutritionist eval. Recommend DASH diet and regular exercise. check CBC, CMP, lipid, TSH.  Advise eye exam by an OD yearly and dental exam every 6 months. will monitor lipid and weight yearly. Recommend safe driving. recommend Hep C, hep B, HIV test. recommend  Tdap, hepB,  flu, covid  shot. We will call pt regarding lab results. f/u as scheduled.   DC chewing tobacco  Pt declined to see a GI for trouble swallowing

## 2025-02-06 ENCOUNTER — DOCUMENTATION (OUTPATIENT)
Dept: OCCUPATIONAL THERAPY | Facility: HOSPITAL | Age: 40
End: 2025-02-06
Payer: COMMERCIAL

## 2025-02-06 NOTE — PROGRESS NOTES
Occupational Therapy                 Therapy Communication Note    Patient Name: Bubba Lew  MRN: [id number]  Department:   Room: Room/bed info not found  Today's Date: 2/6/2025     Discipline: Occupational Therapy          Missed Visit Reason:      Missed Time: No Show    Comment: Patient discharged to home program.

## 2025-02-12 ENCOUNTER — OFFICE VISIT (OUTPATIENT)
Dept: SLEEP MEDICINE | Facility: CLINIC | Age: 40
End: 2025-02-12
Payer: COMMERCIAL

## 2025-02-12 VITALS
BODY MASS INDEX: 28.72 KG/M2 | HEART RATE: 68 BPM | HEIGHT: 67 IN | DIASTOLIC BLOOD PRESSURE: 84 MMHG | SYSTOLIC BLOOD PRESSURE: 129 MMHG | RESPIRATION RATE: 16 BRPM | WEIGHT: 183 LBS | OXYGEN SATURATION: 96 %

## 2025-02-12 DIAGNOSIS — G47.00 INSOMNIA, UNSPECIFIED TYPE: ICD-10-CM

## 2025-02-12 DIAGNOSIS — G47.30 SLEEP-DISORDERED BREATHING: Primary | ICD-10-CM

## 2025-02-12 PROCEDURE — 99214 OFFICE O/P EST MOD 30 MIN: CPT | Performed by: INTERNAL MEDICINE

## 2025-02-12 ASSESSMENT — SLEEP AND FATIGUE QUESTIONNAIRES
HOW LIKELY ARE YOU TO NOD OFF OR FALL ASLEEP WHILE WATCHING TV: SLIGHT CHANCE OF DOZING
SITING INACTIVE IN A PUBLIC PLACE LIKE A CLASS ROOM OR A MOVIE THEATER: WOULD NEVER DOZE
HOW LIKELY ARE YOU TO NOD OFF OR FALL ASLEEP WHILE SITTING QUIETLY AFTER LUNCH WITHOUT ALCOHOL: WOULD NEVER DOZE
HOW LIKELY ARE YOU TO NOD OFF OR FALL ASLEEP WHILE LYING DOWN TO REST IN THE AFTERNOON WHEN CIRCUMSTANCES PERMIT: SLIGHT CHANCE OF DOZING
ESS-CHAD TOTAL SCORE: 2
DIFFICULTY_STAYING_ASLEEP: VERY SEVERE
DIFFICULTY_FALLING_ASLEEP: VERY SEVERE
SATISFACTION_WITH_CURRENT_SLEEP_PATTERN: VERY DISSATISFIED
WORRIED_DISTRESSED_DUE_TO_SLEEP: VERY MUCH NOTICEABLE
HOW LIKELY ARE YOU TO NOD OFF OR FALL ASLEEP WHILE SITTING AND READING: WOULD NEVER DOZE
WAKING_TOO_EARLY: SEVERE
SLEEP_PROBLEM_INTERFERES_DAILY_ACTIVITIES: VERY MUCH NOTICEABLE
SLEEP_PROBLEM_NOTICEABLE_TO_OTHERS: MUCH
HOW LIKELY ARE YOU TO NOD OFF OR FALL ASLEEP WHILE SITTING AND TALKING TO SOMEONE: WOULD NEVER DOZE
HOW LIKELY ARE YOU TO NOD OFF OR FALL ASLEEP WHEN YOU ARE A PASSENGER IN A CAR FOR AN HOUR WITHOUT A BREAK: WOULD NEVER DOZE
HOW LIKELY ARE YOU TO NOD OFF OR FALL ASLEEP IN A CAR, WHILE STOPPED FOR A FEW MINUTES IN TRAFFIC: WOULD NEVER DOZE

## 2025-02-12 ASSESSMENT — PATIENT HEALTH QUESTIONNAIRE - PHQ9
1. LITTLE INTEREST OR PLEASURE IN DOING THINGS: NOT AT ALL
2. FEELING DOWN, DEPRESSED OR HOPELESS: NOT AT ALL
SUM OF ALL RESPONSES TO PHQ9 QUESTIONS 1 AND 2: 0

## 2025-02-12 ASSESSMENT — PAIN SCALES - GENERAL: PAINLEVEL_OUTOF10: 2

## 2025-02-12 NOTE — PROGRESS NOTES
[facility] SLEEP MEDICINE   NEW CONSULT VISIT NOTE    PCP: Jaun Palacio MD  Referred by: Zana Palacio MD PhD    HISTORY OF PRESENT ILLNESS     Patient ID: Bubba Lew is a 39 y.o. male who presents to [facility] for a comprehensive sleep medicine evaluation with concerns of {SleepCC:92181}.    Patient is here {pxarrival:18424}.  To review, patient's medical history is notable for ***      {OSAhx:11110}    SLEEP STUDY HISTORY (personally reviewed raw data such as interpretation report, data sheet, hypnogram, and titration table if available and applicable)  ***    SLEEP-WAKE SCHEDULE  Bedtime:  11 PM on weekdays, 9-11 PM on weekends  Subjective sleep latency: 3-6 hours (sleeps at 2-3 AM)  Difficulty falling asleep: yes due to mind-racing / rumination / worry  Number of awakenings:  once per night after sleeping solid 2-3 hours  Nocturia: No  Falls back asleep right away sometimes cannot fall back asleep  Final wake time:  7 AM on weekdays, 12 NN on weekends  Out of bed time:  7 AM on workdays, 12 NN on off-workdays  Shift work: No  Naps: no  Average sleep duration (excluding naps): 4-6 hours     SLEEP ENVIRONMENT  Sleep location: bed  Sleep status: sleeps alone  Preferred sleep position: back and right side  TV in bedroom: Yes  Room is dark: Yes  Room is quiet: Yes  Room is cool: Yes    SLEEP HABITS  Smoking: no  ETOH: no  Marijuana: no  Caffeine: 2 cups coffee daily in the morning Drinks green tea   Sleep aids: no    SLEEP ROS  Night symptoms: Patient denies symptoms of snoring or breathing concerns at night  Morning symptoms: POSITIVE for unrefreshing sleep and morning headache and NEGATIVE for morning dry mouth and morning sore throat  Daytime symptoms POSITIVE for fatigue and irritability in daytime and NEGATIVE for excessive daytime sleepiness, trouble remembering things in daytime, trouble staying focused in daytime, and drowsy driving  Hypersomnia / narcolepsy  "symptoms: Patient denies symptoms of a hypersomnolence disorder such as sleep paralysis, sleep-related hallucinations, and cataplexy.   Parasomnia symptoms: Patient denies symptoms of parasomnia such as sleepwalking, sleeptalking, sleep-eating, acting out dreams, and nightmares.   Movements in sleep: Patient denies problematic movements in sleep such as seizures during sleep, frequent leg kicks / jerks while asleep, sleep-related bruxism, and waking up with bedsheets in disarray.    RLS screen: Patient denies RLS symptoms.    WEIGHT: stable    Claustrophobia: No    REVIEW OF SYSTEMS     All other systems have been reviewed and are negative.    ALLERGIES     No Known Allergies    MEDICATIONS     No current outpatient medications on file.     No current facility-administered medications for this visit.       PAST HISTORIES     PERTINENT PAST MEDICAL HISTORY: See HPI    PERTINENT PAST SURGICAL HISTORY for Sleep Medicine:  tonsillectomy and adenoidectomy    PERTINENT FAMILY HISTORY for Sleep Medicine:  Patient denies family history of sleep apnea.    PERTINENT SOCIAL HISTORY:  He  reports that he has never smoked. His smokeless tobacco use includes chew. He reports that he does not currently use alcohol. He reports that he does not use drugs. 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 No significant changes unless documented in the pertinent chart section. Updates made when necessary.     PHYSICAL EXAM     VITAL SIGNS: /84   Pulse 68   Resp 16   Ht 1.702 m (5' 7\")   Wt 83 kg (183 lb)   SpO2 96%   BMI 28.66 kg/m²     NECK CIRCUMFERENCE: 15.25 inches    ESS: 2  STEVO: 26    Physical Exam  Constitutional: Awake, not in distress  Head: normocephalic, atraumatic  Craniofacial: no retrognathia  Sinus: no tenderness to palpation  Nose: *** nasal septum, no nasal congestion, *** bilateral " "inferior turbinate hypertrophy, hard to breathe on *** nostril, *** Hans's maneuver  Dental: Class *** bite, *** overjet, *** crossbite  Palate: Normal / Narrow / High-arched / *** torus palatini  Oropharynx: Michael tongue position ***, Mallampati ***, lateral wall narrowing grade ***   Tonsils: ***  Uvula: midline on phonation, *** elongated, *** swollen  Tongue: Midline on protrusion, *** Tongue scalloping, *** macroglossia  Lungs: Clear to auscultation bilateral, no rales  Heart: Regular rate and rhythm, no murmurs  Skin: Warm, no rash  Neuro: No tremors, moves all extremities  Psych: alert and oriented to time, place, and person    RESULTS/DATA     No results found for: \"IRON\", \"TRANSFERRIN\", \"IRONSAT\", \"TIBC\", \"FERRITIN\"    No results found for: \"CO2\"      ASSESSMENT/PLAN     Bubba Lew is a 39 y.o. male who is seen today in [facility] for the following problems:.     SUSPECTED SLEEP APNEA  OBSTRUCTIVE SLEEP APNEA, *** positional ({EWssbaseline:69737} {EWrespiindices:86699}  SLEEP-RELATED HYPOXEMIA  {EWPLANFOROSAEVAL:27069}  {EWsplit&titratecomments:13668}  - Personally reviewed the sleep study's raw data such as interpretation report, data sheet, and hypnogram. A copy of recent testing was either given to patient or released in [employer] [facility]. See HPI for detailed summary of sleep study results.  - Discussed sleep study results and treatment options with patient.  - Recommend starting auto-CPAP *** cm H2O with EPR ***, heated humidification, heated tubings, and mask used in sleep lab (***). Orders sent to {DME:48972}.  - Recommend starting auto-CPAP *** cm H2O with EPR ***, heated humidification, heated tubings, and mask fitting session. Patient is interested in trying the following mask: ***. Orders sent to {DME:81908}.  - Discussed 30-day mask guarantee and insurance requirement regarding PAP compliance and follow-up.   - Advised about cleaning instructions and replacement " schedule of PAP accessories.  - Discussed sleep apnea in detail to include pathophysiology, risk factors, diagnostic options, treatment options, and cardiovascular / neurologic consequences of untreated MELE.   - Supportive management as follows: Lose weight. Stay off your back when sleeping. Avoid smoking, alcohol, and sedating medications if applicable. Don't drive when sleepy.    OBSTRUCTIVE SLEEP APNEA, *** positional ({EWssbaseline:36808} {EWrespiindices:35884}  SLEEP-RELATED HYPOXEMIA  - Now on auto-CPAP *** cm H2O and EPR ***  - Doing well with improved MELE symptoms.   - PAP adherence data reviewed today. Usage days ***/30, days> 4 hours at ***%, residual AHI ***.   {EWPAPFUPLANS:71706}  - Discussed sleep apnea in detail to include pathophysiology, risk factors, diagnostic options, treatment options, and cardiovascular / neurologic consequences of untreated MELE.   - Supportive management as follows: Lose weight. Stay off your back when sleeping. Avoid smoking, alcohol, and sedating medications if applicable. Don't drive when sleepy.    SEASONAL ALLERGIES / ALLERGIC RHINITIS / CHRONIC NASAL CONGESTION / POST-NASAL DRIP  - Start fluticasone nasal spray 2 sprays per nostril once daily 1 hour before bedtime.  - If there is inadequate or lack of improvement on the above intranasal steroid spray, consider adding Azelastine nasal spray, 2 sprays per nostril once daily in the morning.   - Alternatively, may add cetirizine or loratadine 10 mg once daily.   - Educated patient on proper use of intranasal sprays.     OBESITY / MORBID OBESITY  - Recommend weight loss with diet and exercise.  - Weight loss can help in long term management of MELE.  - Defer management to PCP.  - Will do preop risk evaluation once patient comes back with good PAP compliance.    HYPERTENSION  - BP stable today.  - BP slightly high today. Denies chest discomfort, shortness of breath, palpitations, headache, blurred vision, dizziness, or  neurologic deficit.  - Untreated  or inadequately treated sleep apnea can lead to new onset hypertension, resistant hypertension, or refractory hypertension.  - Continue anti-hypertensive medications per PCP.  - Supportive management: low salt DASH diet (less than 2000 mg sodium intake daily), moderate intensity aerobic exercise at least 30 minutes 5 days per week, reduce stress, quit smoking, limit alcohol, lose weight, and monitor BP once daily  - PCP following. Defer management to PCP.    ATRIAL FIBRILLATION, ***  - currently in sinus rhythm / rate-controlled***  - Continue meds per Cardio.  - Cardio following. Defer management to Cardio.    {EWMenlo Park VA HospitalOW-UP:33824}    All of patient's questions were answered. He verbalizes understanding and agreement with my assessment and plan. Refer to after-visit-summary (AVS) for patient education and more details on sleep study preparation, troubleshooting issues with PAP usage, proper cleaning instructions of PAP supplies, and usual recommended replacement schedule for each of the PAP supplies.

## 2025-02-20 NOTE — PROGRESS NOTES
[facility]  Hand and Upper Extremity Service  Follow up visit         Follow up for: Right thumb    Interval History: He returns for his right thumb. He underwent a stage 2 FPL tendon reconstruction on 11/29/24 and did well following. He saw therapy initially after his first postoperative visit but he's missed every scheduled therapy appointment since that visit to the point that they discharged him from therapy and he's having a difficult time scheduling another. He has a therapy appointment scheduled for later this week. 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               Past medical history, medications, allergies, surgical history and review of systems are reviewed and otherwise unchanged when compared to last visit on 1/14/25         Examination:  Constitutional: Oriented to person, place, and time.  Appears well-developed and well-nourished.  Head: Normocephalic and atraumatic.  Eyes: Pupils are equal, round, and reactive to light.  Cardiovascular: Intact distal pulses.  Pulmonary/Chest/Breast: Effort normal. No respiratory distress.  Neurological: Alert and oriented to person, place, and time.  Skin: Skin is warm and dry.  Psychiatric: normal mood and affect.  Behavior is normal.  Musculoskeletal: Right hand reveals well healed surgical incision on thumb and distal forearm. Mild MP and IP joint flexion contracture of thumb. Demonstrates good power with thumb IP joint flexion but has difficulty with combined thumb IP and MP joint flexion. With wrist flexion, he has an even more difficult time with thumb flexion.        Impression: Sequela of right thumb FPL laceration       Plan: Unfortunately I think he's missed a window for recovery with the fact he hasn't shown to a single therapy visit for several months. While restarting therapy may help, he's not  going to have the same potential benefit he would've had if he had gone to his therapy sessions. He wants to see how therapy help when he starts again this week and we'll have him return in 4 weeks for repeat clinical examination and at that point he'll have to make a decision on whether he wants to return to work or if functional capacity evaluation for determination of work restrictions will be more beneficial.       Follow up: 4 weeks              Yves Montilla MD  [facility]  Department of Orthopaedic Surgery  Hand and Upper Extremity Reconstruction    Scribe Attestation  By signing my name below, I, Leslie Kruger   attest that this documentation has been prepared under the direction and in the presence of Dr. Yves Montilla.    Dictation performed with the use of voice recognition software.  Syntax and grammatical errors may exist.

## 2025-02-25 ENCOUNTER — OFFICE VISIT (OUTPATIENT)
Dept: ORTHOPEDIC SURGERY | Facility: HOSPITAL | Age: 40
End: 2025-02-25
Payer: COMMERCIAL

## 2025-02-25 VITALS — HEIGHT: 67 IN | BODY MASS INDEX: 28.72 KG/M2 | WEIGHT: 183 LBS

## 2025-02-25 DIAGNOSIS — S56.021A LACERATION OF FLEXOR TENDON OF RIGHT THUMB: Primary | ICD-10-CM

## 2025-02-25 PROCEDURE — 99211 OFF/OP EST MAY X REQ PHY/QHP: CPT | Performed by: ORTHOPAEDIC SURGERY

## 2025-02-27 ENCOUNTER — TREATMENT (OUTPATIENT)
Dept: OCCUPATIONAL THERAPY | Facility: HOSPITAL | Age: 40
End: 2025-02-27
Payer: COMMERCIAL

## 2025-02-27 DIAGNOSIS — S56.021A LACERATION OF FLEXOR TENDON OF RIGHT THUMB: ICD-10-CM

## 2025-02-27 PROCEDURE — 97140 MANUAL THERAPY 1/> REGIONS: CPT | Mod: GO | Performed by: OCCUPATIONAL THERAPIST

## 2025-02-27 PROCEDURE — 97110 THERAPEUTIC EXERCISES: CPT | Mod: GO | Performed by: OCCUPATIONAL THERAPIST

## 2025-02-27 NOTE — PROGRESS NOTES
Occupational Therapy  Occupational Therapy Orthopedic Treatment Note    Patient Name: Bubba Lew  MRN: [id number]  Today's Date: 2/27/2025       Insurance:  Visit number: 3 of 12  1/6/25 - J - 2025 [facility] / CLAIM # [id number] / DOI 4/13/24 / DX: S56.021 / OT APPROVED FOR 2X WEEK FOR 6 WEEKS FROM 11/1/24 TO 3/31/25 / C9 IN    Time:  Time Calculation  Start Time: 1420  Stop Time: 1455  Time Calculation (min): 35 min  OT Therapeutic Procedures Time Entry  Manual Therapy Time Entry: 15  Therapeutic Exercise Time Entry: 15     Insurance Type: Payor: [employer] / Plan: [employer] / Product Type: *No Product type* /     General:  Reason for visit: R thumb  Referred by: Roldan Rodrigues    Current Problem  1. Laceration of flexor tendon of right thumb  Follow Up In Occupational Therapy        Precautions: per post op protocol     Medical History Form: Reviewed (scanned into chart)    Subjective: I still have trouble straightening my thumb.  Chief Complaint: R thumb  HOOD: Chronic    Date of surgery: 11/29/24  Surgery(s) performed: Stage 2 FPL tendon reconstruction right thumb     Hand Dominance: Right    Current Condition since injury:   better      PAIN     Location: volar thumb  Intensity: 1/10 up to 3/10  Description: sharp    Relevant Information (PMH & Previous Tests/Imaging): reviewed in chart       Prior Level of Function (PLOF)  Exercise/Physical Activity: n/a  Work/School:    Current ADL/IADL Status: homemaking     Patients Living Environment: Reviewed and no concern    Primary Language: English    Pt goals for therapy: improve functional use of R thumb for manipulating objects in hand, cooking, cleaning, eating, opening jars, work tasks, and various household chores.       Red Flags: Do you have any of the following? No  Fever/chills, unexplained weight changes, dizziness/fainting, unexplained change in bowel or bladder functions, unexplained malaise or muscle  weakness, night pain/sweats, numbness or tingling    Objective:    Active thumb extension/ flexion:  MCP: 30 /55 was 45    IP: +25/65 was 35   Opposition to tip of 5th  Wrist extension/flexion 50/75   strength 110#/130#  Lateral pinch 16#/24#    Physical Observation: mild edema, patient reports good compliance with splint wear schedule and completion of short arc thumb flexion     Numbness/Tingling: numbness along tip of thumb    Outcome Measures:  Quick DASH: 75    EDUCATION: home exercise program, plan of care, activity modifications, pain management, and injury pathology       Goals:  Active       OT Goals       OT Goal 1       Start:  01/07/25    Expected End:  02/04/25       Improve thumb IP flexion to 40 degrees for grasping objects in hand.          OT Goal 2       Start:  01/07/25    Expected End:  02/04/25       Patient to report compliance with home program for improved functional use of thumb.          OT Goal 3       Start:  01/07/25    Expected End:  03/04/25       Improve Quick DASH to 15%.         OT Goal 4       Start:  01/07/25    Expected End:  03/04/25       Report max pain of 2/10 for household chores.         OT Goal 5       Start:  01/07/25    Expected End:  03/04/25       Improve  strength to 75% of unaffected side for lifting heavier household objects.           Resolved       OT Goals       OT Goal 1 (Not met)       Start:  07/10/24    Expected End:  10/08/24    Resolved:  01/07/25    Patient to report compliance with orthosis wear schedule and completion of home program to improve functional use of R hand.         OT Goal 2 (Not met)       Start:  09/10/24    Expected End:  10/08/24    Resolved:  01/07/25    Improve passive MCP flexion to 55 degrees for grasping objects in hand.         OT Goal 3 (Not met)       Start:  09/10/24    Expected End:  11/05/24    Resolved:  01/07/25    Improve Quick DASH to 15%.         OT Goal 4 (Not met)       Start:  09/10/24    Expected End:   11/05/24    Resolved:  01/07/25    Improve passive thumb IP flexion to 60 degrees for handwriting.         OT Goal 5 (Not met)       Start:  09/10/24    Expected End:  11/05/24    Resolved:  01/07/25    Report max pain of 2/10 for work tasks.             Plan of care was developed with input and agreement by the patient    Treatments:       Modalities                  5 min  Heating pad applied to circumferential hand.       Therapeutic Exercise:                         15 min  HEP education and completion : short arc gentle thumb flexion and IP flexion, thumb opposition, manipulation and transfer with foam cubes  Patient performed thumb flexion with therapist applying manual scar mobilization - did not complete today  Therapist fabricated volar thumb extension splint for night use.     Manual Therapy:                                           15 min  Therapist performed manual thumb and digit flexion stretches: MCP flexion and IP flexion. Therapist performed manual scar mobilization.  Patient issued silicone gel pad for night use to soften scar.         Assessment:    Therapist fabricated mobilization orthosis today. Patient to continue working on range of motion and scar mobilization.  and pinch strength assessed.  Patient to follow up with splint adjustment as needed.    Plan:      Patient to follow up prn for orthosis adjustment.      Abdoulaye East, OT

## 2025-03-17 ENCOUNTER — CLINICAL SUPPORT (OUTPATIENT)
Dept: SLEEP MEDICINE | Facility: CLINIC | Age: 40
End: 2025-03-17
Payer: COMMERCIAL

## 2025-03-17 DIAGNOSIS — G47.33 OBSTRUCTIVE SLEEP APNEA (ADULT) (PEDIATRIC): ICD-10-CM

## 2025-03-17 DIAGNOSIS — G47.30 SLEEP-DISORDERED BREATHING: ICD-10-CM

## 2025-03-17 PROCEDURE — 95806 SLEEP STUDY UNATT&RESP EFFT: CPT | Performed by: INTERNAL MEDICINE

## 2025-03-18 PROBLEM — E66.3 OVERWEIGHT (BMI 25.0-29.9): Status: ACTIVE | Noted: 2025-03-18

## 2025-03-18 NOTE — PROGRESS NOTES
Nutrition Initial Assessment:     Patient Bubba Lew is a 39 y.o. male being seen at [facility] who was referred by Zana Palacio MD PhD  on 2/5/2025 for   1. Overweight (BMI 25.0-29.9)  Referral to Nutrition Services          Nutrition Assessment    Patient Active Problem List   Diagnosis    Laceration of flexor tendon of right thumb    Laceration of flexor muscle, fascia and tendon of right thumb at forearm level, initial encounter    Uncomplicated asthma (HHS-HCC)    Overweight (BMI 25.0-29.9)       Nutrition History:  Food & Nutrition History: Pt reports hand injury at work that has affected all aspects of his health. Pt reports he has gained about 20# over the past year and doesn't sleep very well. Pt reports his energy levels are not the same either. Pt reports wanting more information about a healthy diet.  Food Allergies:  (none);  Food Intolerances: none  Vitamin/mineral intake:  (none)  (none)  Herbal supplements: none  Medication and Complementary/Alternative Medicine Use:  none  GI Symptoms:  (none)  Mouth Issues:  (none); Teeth Issues:  (none)  Sleep Habits: Insomnia, 1-4 hrs disrupted, 5-6 hrs disrupted    Diet Recall:  Meal 1: 8-10 am: eggs and toast or waffle or leftover pizza, piece of chicken, skip  Snack 1: skip  Meal 2: skip  Snack 2: skip  Meal 3: 6-7pm: pasta, spaghetti, pizza, steak, chicken-meal, veggies  Snack 3: wanton noodles wrap, tortilla chips and hummus  Food Variety: Absent (missing fruit and veggies)  Oral Nutrition Supplement Use: Oral Nutrition Supplements:  (none)          Fluid Intake: vitamin water, sport electrolye, water (2-3), a lot of coke 4-6 not everyday, cranberry juice  Energy Intake: Good > 75 %      Food Preparation:  Cooking: Patient  Grocery Shopping: Patient  Dining Out: More than 3 times a week    Physical Activity:   Pt reports due to hand injury mostly sedentary other then physical therapy. Pt reports previously had a very active job.    Food  "Security/Insecurity:  none    Anthropometrics:                            Weight History:   Daily Weight  02/25/25 : 83 kg (183 lb)  02/12/25 : 83 kg (183 lb)  02/05/25 : 83.9 kg (185 lb)  01/14/25 : 80.7 kg (178 lb)  12/17/24 : 80.7 kg (178 lb)  12/02/24 : 80.7 kg (178 lb)  11/29/24 : 80.8 kg (178 lb 2.1 oz)  10/29/24 : 70.3 kg (155 lb)  09/17/24 : 70.3 kg (155 lb)  09/03/24 : 70.3 kg (155 lb)    Weight Change %: Pt reports increase in weight over the past year due to hand injury about 20#.        Nutrition Focused Physical Exam Findings:  Subcutaneous Fat Loss:   Defer Subcutaneous Fat Loss Assessment: Defer all  Defer All Reason: Visually appears well nourished with no signs of noticeable wasting    Muscle Wasting:  Defer Muscle Wasting Assessment: Defer all  Defer All Reason: Visually appears well nourished with no signs of noticeable wasting    Physical Findings:  Hair: Negative  Eyes: Negative  Nails: Negative  Skin: Negative  Respiratory: Negative  Edema: none      Nutrition Significant Labs:  Last Chem:     Chemistry    No results found for: \"NA\", \"K\", \"CL\", \"CO2\", \"BUN\", \"CREATININE\", \"GLU\" No results found for: \"CALCIUM\", \"ALKPHOS\", \"AST\", \"ALT\", \"BILITOT\"    , CMP Trend:  No results for input(s): \"GLUCOSE\", \"NA\", \"K\", \"CL\", \"CO2\", \"ANIONGAP\", \"BUN\", \"CREATININE\", \"EGFR\", \"CALCIUM\", \"ALBUMIN\", \"ALKPHOS\", \"PROT\", \"AST\", \"BILITOT\", \"ALT\" in the last 07900 hours., CBC Trend: No results for input(s): \"WBC\", \"NRBC\", \"RBC\", \"HGB\", \"HCT\", \"MCV\", \"MCH\", \"MCHC\", \"RDW\", \"PLT\" in the last 11893 hours., RFP + Serum Mag Trend: No results for input(s): \"GLUCOSE\", \"NA\", \"K\", \"CL\", \"CO2\", \"ANIONGAP\", \"BUN\", \"CREATININE\", \"EGFR\", \"CALCIUM\", \"PHOS\", \"ALBUMIN\", \"MG\" in the last 15478 hours., LFT Trend: No results for input(s): \"ALBUMIN\", \"BILITOT\", \"BILIDIR\", \"ALKPHOS\", \"ALT\", \"AST\", \"PROT\" in the last 04344 hours., DM Specific Labs Trend (Includes HgbA1C, antibodies & fasting insulin): No results for input(s): \"HGBA1C\", " "\"CPEPTIDE\", \"INSULFAST\" in the last 23579 hours.    No lab exists for component: \"BHDRXBUT\", \"MLX9ETE\", Lipid Panel Trend:  No results for input(s): \"CHOL\", \"HDL\", \"LDLCALC\", \"LDLF\", \"VLDL\", \"TRIG\" in the last 36360 hours., Iron Panel + Serum Ferritin Trend: No results for input(s): \"IRON\", \"UIBC\", \"TIBC\", \"IRONSAT\", \"FERRITIN\" in the last 09520 hours., Vitamin B12: No results found for: \"JSMOWJNQ90\" , Folate: No results found for: \"FOLATE\" , Vitamin D: No results found for: \"VITD25\" , and CRP Trend (last 3): No results found for: \"HSCRP\"     Medications:  Current Outpatient Medications   Medication Instructions    albuterol sulfate (Proair Digihaler) 90 mcg/actuation aero powdr breath act w/sensor inhaler 2 puffs, inhalation, Every 4 hours PRN        Estimated Needs:  We did not discuss today.             Nutrition Diagnosis   Malnutrition Diagnosis  Patient has Malnutrition Diagnosis: No    Nutrition Diagnosis  Patient has Nutrition Diagnosis: Yes  Diagnosis Status (1): New  Nutrition Diagnosis 1: Food and nutrition related knowledge deficit  Related to (1): limited or lack of prior nutrition-related education  As Evidenced by (1): per pt report looking for guidance on healthy eating patterns, intake of unbalanced meals and snacks per diet recall       Nutrition Interventions/Recommendations   Nutrition Prescription: Oral nutrition General Healthy Diet    Nutrition Interventions:   Food and Nutrient Delivery: Meals & Snacks: Energy-modified diet, General Healthful Diet     Coordination of Care:   none    Nutrition Education:   Nutrition Education Content: Content related nutrition education  Balanced meals using plate method, timing of meals, adequate hydration, benefits of exercise    Nutrition Education Topics Discussed:   Provided Keys for a Healthy Lifestyle Handout. Discussed the following:  Start a daily exercise routine. Adults should target 150 minutes of moderate intensity exercise each week. Start slow " and work your way up to this amount. Provided examples of aerobic, resistance, and stretching/balance activities.  Plan balanced meals. The “Plate Method” is a tool used to plan balanced meals. Aim for the following:  One-third to half the plate (or the meal) should be a non-starchy vegetable. Non-starchy vegetables include broccoli, cauliflower, salad greens, carrots, cucumbers, asparagus, green beans, tomatoes, and many more.  One-third to a quarter of the plate should be a lean protein. Lean proteins include chicken, turkey, fish, lean beef, eggs, nuts, tofu.  One third to a quarter of the plate can be a complex starch or carbohydrate. Examples of complex starches / carbohydrates include starchy vegetables (potatoes, peas, corn, and butternut squash), grains (whole wheat bread, quinoa, and brown rice), legumes (lentils and beans), and fruit.  Olive oil should be the primary fat source used for cooking.  Use a 9-10 inch plate to help manage portions  Pre-plan meal ideas. Spending time planning upfront saves you from relying on convenience foods. It can also help you save money! Your plan does not need to be rigid, but you should have some idea of what meals you are going to make going into the week. Place this information on the refrigerator in plain sight. There are many products you can purchase to help keep you organized.   Stay hydrated with water or other lower calorie beverages. We often confuse our body's signal for thirst with being hungry. Make sure you are staying hydrated, preferably with water. The best indicator of hydration is your urine. It should be a pale yellow. Provided examples of sugar-free beverages and ways to flavor water.   Pay attention to your body's hunger and fullness cues. Introduced patient to using a scale of 1-10 to rate hunger / satiety. Reviewed signs of hunger that patient might or might not feel, and encouraged being attentive to these signals if they are present. Encouraged  "patient to eat when feeling a \"3-4\" on the scale instead of waiting for hunger to become more severe. Encouraged patient to aim to stop eating when feeling at a \"6\" (satisfied, but could eat a little more) or a \"7\" (full but not uncomfortable). Recommended eating slowly and checking in with body to determine when body is really full. Discussed that it takes the brain around 10-15 minutes after eating to feel full. Encouraged using this method in conjunction with balanced foods on the plate using the Plate Method.      Educational Handouts Provided: ADA plate, [facility] metabolism, high protein meal and snack ideas, healthy breakfast     Nutrition Counseling:   Nutrition Counseling Strategies : Nutrition counseling based on motivational interviewing strategy, Nutrition counseling based on goal setting strategy    Patient Goals:  To start eating a afternoon meal or protein drink during the afternoon.     Readiness to Change : Good  Level of Understanding : Good  Anticipated Compliant : Good         Nutrition Monitoring and Evaluation   Food and Nutrient Intake  Monitoring and Evaluation Plan: Meal/snack pattern  Meal/Snack Pattern: Estimated meal and snack pattern  Criteria: Monitor patient's progress towards meal and snack goal(s)                             Follow Up: 4-6 weeks        "

## 2025-03-19 ENCOUNTER — NUTRITION (OUTPATIENT)
Dept: NUTRITION | Facility: HOSPITAL | Age: 40
End: 2025-03-19
Payer: COMMERCIAL

## 2025-03-19 DIAGNOSIS — E66.3 OVERWEIGHT (BMI 25.0-29.9): Primary | ICD-10-CM

## 2025-03-19 PROCEDURE — 97802 MEDICAL NUTRITION INDIV IN: CPT

## 2025-03-19 NOTE — PROGRESS NOTES
Type of Study: HOME SLEEP STUDY - NOMAD     The patient received equipment and instructions for use of the [employer] Nomad HSAT device. The patient was instructed how to apply the effort belts, cannula, thermistor. It was also explained how the Nomad and oximeter components work.  The patient was asked to record their sleep for an 8-hour period.     The patient was informed of their responsibility for the device and acknowledged this by signing the HSAT device contract. The patient was asked to return the device on 03/18/2025 by 10 AM to the Respiratory Care Department at [facility].     The patient was instructed to call 911 as usual for any medical- emergencies while at home.  The patient was also given a phone number for troubleshooting when using the device in case there were additional questions.

## 2025-03-20 NOTE — PROGRESS NOTES
[facility]  Hand and Upper Extremity Service  Follow up visit         Follow up for: Right thumb     Interval History: He returns for his right thumb. At his last visit he wanted to do more therapy before consideration of returning to work. He completed one therapy session before he was given a home therapy program. Overall he remains concerned of the tightness through the first webspace when he attempts to palmarly abduct the thumb and has inability to hyperextend the thumb IP joint as much on the right side as he can the left.               Past medical history, medications, allergies, surgical history and review of systems are reviewed and otherwise unchanged when compared to last visit on 2/25/25         Examination:  Constitutional: Oriented to person, place, and time.  Appears well-developed and well-nourished.  Head: Normocephalic and atraumatic.  Eyes: Pupils are equal, round, and reactive to light.  Cardiovascular: Intact distal pulses.  Pulmonary/Chest/Breast: Effort normal. No respiratory distress.  Neurological: Alert and oriented to person, place, and time.  Skin: Skin is warm and dry.  Psychiatric: normal mood and affect.  Behavior is normal.  Musculoskeletal: Right hand reveals well healed traumatic and surgical wounds. When he palmarly abducts the thumb, there's more tightness in the skin across the first webspace on the right than on the left. Fairly symmetric thumb abduction opening. Tightness along the flexor surface which limits active and passive hyperextension of the thumb IP joint and has a mild MP joint flexion contracture. Able to demonstrate good active combined MP and IP joint flexion.        Impression: Sequela of right thumb laceration with flexor tendon injury       Plan: At this point he's not going to likely improve and I believe he's reached maximum medical improvement. He doesn't believe he has a job to return to so for that reason, we'll submit a  C9 requesting for functional capacity evaluation to identify permanent work restrictions and vocational rehabilitation to help with job search. Once this has been approved, he'll get his FCE scheduled and then we can get permanent work restrictions to guide his vocational rehabilitation process.       Follow up: As needed              Yves Montilla MD  [facility]  Department of Orthopaedic Surgery  Hand and Upper Extremity Reconstruction    Scribe Attestation  By signing my name below, I, Jerald Weldon , Scribe   attest that this documentation has been prepared under the direction and in the presence of Dr. Yves Montilla.    Dictation performed with the use of voice recognition software.  Syntax and grammatical errors may exist.

## 2025-03-25 ENCOUNTER — OFFICE VISIT (OUTPATIENT)
Dept: ORTHOPEDIC SURGERY | Facility: HOSPITAL | Age: 40
End: 2025-03-25
Payer: COMMERCIAL

## 2025-03-25 VITALS — BODY MASS INDEX: 28.72 KG/M2 | HEIGHT: 67 IN | WEIGHT: 183 LBS

## 2025-03-25 DIAGNOSIS — S56.021A LACERATION OF FLEXOR TENDON OF RIGHT THUMB: Primary | ICD-10-CM

## 2025-03-25 PROCEDURE — 99213 OFFICE O/P EST LOW 20 MIN: CPT | Performed by: ORTHOPAEDIC SURGERY

## 2025-04-07 ENCOUNTER — APPOINTMENT (OUTPATIENT)
Dept: ORTHOPEDIC SURGERY | Facility: CLINIC | Age: 40
End: 2025-04-07
Payer: COMMERCIAL

## 2025-04-07 VITALS — WEIGHT: 183 LBS | HEIGHT: 67 IN | BODY MASS INDEX: 28.72 KG/M2

## 2025-04-07 DIAGNOSIS — S56.021A LACERATION OF FLEXOR TENDON OF RIGHT THUMB: Primary | ICD-10-CM

## 2025-04-07 NOTE — PROGRESS NOTES
[facility]  Hand and Upper Extremity Service  Follow up visit         Follow up for: Right thumb     Interval History: He returns for his right thumb. He was seen two weeks ago and at that time he was referred for a functional capacity evaluation and vocational rehabilitation in an effort to identify what his permanent restrictions are and help him find a suitable job as he does not have his former job available. He remains frustrated that he has motion abnormalities with his thumb, particularly that he cannot hyperextend the thumb with the right hand that he can on the left. 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               Past medical history, medications, allergies, surgical history and review of systems are reviewed and otherwise unchanged when compared to last visit on 3/25/25         Examination:  Constitutional: Oriented to person, place, and time.  Appears well-developed and well-nourished.  Head: Normocephalic and atraumatic.  Eyes: Pupils are equal, round, and reactive to light.  Cardiovascular: Intact distal pulses.  Pulmonary/Chest/Breast: Effort normal. No respiratory distress.  Neurological: Alert and oriented to person, place, and time.  Skin: Skin is warm and dry.  Psychiatric: normal mood and affect.  Behavior is normal.  Musculoskeletal:  Right hand reveals well healed traumatic and surgical wounds. When he palmarly abducts the thumb, there's more tightness in the skin across the first webspace on the right than on the left. Fairly symmetric thumb abduction opening. Tightness along the flexor surface which limits active and passive hyperextension of the thumb IP joint and has a mild MP joint flexion contracture. Able to demonstrate good active combined MP and IP joint flexion.       Impression: Sequela of right thumb  laceration and subsequent reconstruction       Plan: Overall I think he has a great thumb. It is certainly not the thumb he had before his injury and he did very little with formal therapy following his reconstruction last fall. Perhaps had he been more compliant going to therapy, his outcome may have been better. I certainly do not think that tenolysis or a type of contracture release are appropriate, particularly given the fact he was not compliant with therapy. He is more than welcomed to gain a second opinion but I do not have anymore surgeries to offer him. He was asked to forward the functional capacity evaluation once completed so we can provide permanent work restrictions for his industrial claim.       Follow up: As needed              Yves Montilla MD  [facility]  Department of Orthopaedic Surgery  Hand and Upper Extremity Reconstruction    Scribe Attestation  By signing my name below, I, Jerald Weldon , Leslie   attest that this documentation has been prepared under the direction and in the presence of Dr. Yves Montilla.    Dictation performed with the use of voice recognition software.  Syntax and grammatical errors may exist.

## 2025-04-16 LAB
ALBUMIN SERPL-MCNC: 4.8 G/DL (ref 3.6–5.1)
ALP SERPL-CCNC: 68 U/L (ref 36–130)
ALT SERPL-CCNC: 10 U/L (ref 9–46)
ANION GAP SERPL CALCULATED.4IONS-SCNC: 6 MMOL/L (CALC) (ref 7–17)
AST SERPL-CCNC: 14 U/L (ref 10–40)
BILIRUB SERPL-MCNC: 0.6 MG/DL (ref 0.2–1.2)
BUN SERPL-MCNC: 10 MG/DL (ref 7–25)
CALCIUM SERPL-MCNC: 9.5 MG/DL (ref 8.6–10.3)
CHLORIDE SERPL-SCNC: 103 MMOL/L (ref 98–110)
CHOLEST SERPL-MCNC: 197 MG/DL
CHOLEST/HDLC SERPL: 4.1 (CALC)
CO2 SERPL-SCNC: 32 MMOL/L (ref 20–32)
CREAT SERPL-MCNC: 0.98 MG/DL (ref 0.6–1.26)
EGFRCR SERPLBLD CKD-EPI 2021: 102 ML/MIN/1.73M2
ERYTHROCYTE [DISTWIDTH] IN BLOOD BY AUTOMATED COUNT: 11.9 % (ref 11–15)
GLUCOSE SERPL-MCNC: 105 MG/DL (ref 65–99)
HCT VFR BLD AUTO: 44.4 % (ref 38.5–50)
HDLC SERPL-MCNC: 48 MG/DL
HGB BLD-MCNC: 14.6 G/DL (ref 13.2–17.1)
LDLC SERPL CALC-MCNC: 115 MG/DL (CALC)
MCH RBC QN AUTO: 29.3 PG (ref 27–33)
MCHC RBC AUTO-ENTMCNC: 32.9 G/DL (ref 32–36)
MCV RBC AUTO: 89 FL (ref 80–100)
NONHDLC SERPL-MCNC: 149 MG/DL (CALC)
PLATELET # BLD AUTO: 240 THOUSAND/UL (ref 140–400)
PMV BLD REES-ECKER: 10.9 FL (ref 7.5–12.5)
POTASSIUM SERPL-SCNC: 4.3 MMOL/L (ref 3.5–5.3)
PROT SERPL-MCNC: 7 G/DL (ref 6.1–8.1)
RBC # BLD AUTO: 4.99 MILLION/UL (ref 4.2–5.8)
SODIUM SERPL-SCNC: 141 MMOL/L (ref 135–146)
TRIGL SERPL-MCNC: 224 MG/DL
TSH SERPL-ACNC: 2.39 MIU/L (ref 0.4–4.5)
WBC # BLD AUTO: 7.4 THOUSAND/UL (ref 3.8–10.8)

## 2025-04-18 ENCOUNTER — NUTRITION (OUTPATIENT)
Dept: NUTRITION | Facility: HOSPITAL | Age: 40
End: 2025-04-18
Payer: COMMERCIAL

## 2025-04-18 DIAGNOSIS — E66.3 OVERWEIGHT (BMI 25.0-29.9): Primary | ICD-10-CM

## 2025-04-18 DIAGNOSIS — Z71.3 DIETARY COUNSELING AND SURVEILLANCE: ICD-10-CM

## 2025-04-18 PROCEDURE — 97803 MED NUTRITION INDIV SUBSEQ: CPT

## 2025-04-18 NOTE — PROGRESS NOTES
{RDNNOTETYPE:23658}  Drink less whole milk 2 gallons 1 gallon   Putting a shake in the afternoon- not everyday  Increasing our whole grains   Reduce the amount of eating out   {Is this a telehealth visit (virtual and/or phone only)? If yes, select drop down and complete the mandatory information. If this is not a telehealth visit, you can skip this and it will appear as a blank space in your final note.:08136}    Nutrition History:  Food & Nutrition History:   Food Allergies: {Food Allergies:97062:x};  Food Intolerances: {Food Intolerance:98757:x}  Vitamin/mineral intake: {Vitamin and Mineral Intake:81626}  Herbal supplements: {Herbal supplements:01995}  Medication and Complementary/Alternative Medicine Use:   GI Symptoms: {GI Symptoms (Optional):36010:x}  Mouth Issues: {Oral Problems:20448:x}; Teeth Issues: {Dentition (Optional):23737:x}  Sleep Habits: {Sleep Habits:38810:x}    Diet Recall:  Meal 1:   Snack:   Meal 2:   Snack:  Meal 3:   Snack:  Food Variety: {PRESENT/ABSENT:04016}  Oral Nutrition Supplement Use: {ONS choices (Optional):46512:x} {Frequency (Optional):91126}  Fluid Intake:   Energy Intake: {Energy Intake:23684:x}  {Nutrition Specialties Section. Does the patient need info charted for TF, TPN, OB, Mindful Eating, DM)? If Yes, click this smart list. If not, then skip & will appear as blank space in final note.. (Optional):06815}    Food Preparation:  Cooking: {Food preperation:79945:x}  Grocery Shopping: {Food preperation:53989:x}  Dining Out: {frequency (Optional):43666}    Physical Activity:       Food Security/Insecurity: {PRESENT/ABSENT:34955:x}    Anthropometrics:                            Weight History:   Daily Weight  04/07/25 : 83 kg (183 lb)  03/25/25 : 83 kg (183 lb)  02/25/25 : 83 kg (183 lb)  02/12/25 : 83 kg (183 lb)  02/05/25 : 83.9 kg (185 lb)  01/14/25 : 80.7 kg (178 lb)  12/17/24 : 80.7 kg (178 lb)  12/02/24 : 80.7 kg (178 lb)  11/29/24 : 80.8 kg (178 lb 2.1 oz)  10/29/24 : 70.3 kg  "(155 lb)    Weight Change %:       Nutrition Focused Physical Exam Findings:  Subcutaneous Fat Loss:        Muscle Wasting:       Physical Findings:  Hair:    Eyes:    Nails:    Skin:    Respiratory:    Edema:        Nutrition Significant Labs:  Last Chem:     Chemistry    Lab Results   Component Value Date/Time     04/15/2025 1214    K 4.3 04/15/2025 1214     04/15/2025 1214    CO2 32 04/15/2025 1214    BUN 10 04/15/2025 1214    CREATININE 0.98 04/15/2025 1214    Lab Results   Component Value Date/Time    CALCIUM 9.5 04/15/2025 1214    ALKPHOS 68 04/15/2025 1214    AST 14 04/15/2025 1214    ALT 10 04/15/2025 1214    BILITOT 0.6 04/15/2025 1214       , CMP Trend:    Recent Labs     04/15/25  1214   GLUCOSE 105*      K 4.3      CO2 32   ANIONGAP 6*   BUN 10   CREATININE 0.98   EGFR 102   CALCIUM 9.5   ALBUMIN 4.8   ALKPHOS 68   PROT 7.0   AST 14   BILITOT 0.6   ALT 10   , CBC Trend:   Recent Labs     04/15/25  1214   WBC 7.4   RBC 4.99   HGB 14.6   HCT 44.4   MCV 89.0   MCH 29.3   MCHC 32.9   RDW 11.9      , RFP + Serum Mag Trend:   Recent Labs     04/15/25  1214   GLUCOSE 105*      K 4.3      CO2 32   ANIONGAP 6*   BUN 10   CREATININE 0.98   EGFR 102   CALCIUM 9.5   ALBUMIN 4.8   , LFT Trend:   Recent Labs     04/15/25  1214   ALBUMIN 4.8   BILITOT 0.6   ALKPHOS 68   ALT 10   AST 14   PROT 7.0   , DM Specific Labs Trend (Includes HgbA1C, antibodies & fasting insulin): No results for input(s): \"HGBA1C\", \"CPEPTIDE\", \"INSULFAST\" in the last 33992 hours.    No lab exists for component: \"BHDRXBUT\", \"TMV9TKV\", Lipid Panel Trend:    Recent Labs     04/15/25  1214   CHOL 197   HDL 48   LDLCALC 115*   TRIG 224*   , Iron Panel + Serum Ferritin Trend: No results for input(s): \"IRON\", \"UIBC\", \"TIBC\", \"IRONSAT\", \"FERRITIN\" in the last 07015 hours., Vitamin B12: No results found for: \"HYSXYMYS52\" , Folate: No results found for: \"FOLATE\" , Vitamin D: No results found for: \"VITD25\" , and " "CRP Trend (last 3): No results found for: \"HSCRP\"     Medications:  Current Outpatient Medications   Medication Instructions    albuterol sulfate (Proair Digihaler) 90 mcg/actuation aero powdr breath act w/sensor inhaler 2 puffs, inhalation, Every 4 hours PRN        Estimated Needs:   ;     ;     ;     ;                    Nutrition Diagnosis                Nutrition Interventions/Recommendations   Nutrition Prescription:        Nutrition Interventions:   Food and Nutrient Delivery:       Coordination of Care:       Nutrition Education:   Nutrition Education Content:         Nutrition Education Topics Discussed:   ***    Educational Handouts Provided: {SR Handouts List:37311}     Nutrition Counseling:        Patient Goals:      {Readiness to Change (Optional):61702}  {Level of Understanding (Optional):64775}  {Anticipated Compliant (Optional):29767}         Nutrition Monitoring and Evaluation                            Goal Status:           Follow Up: {OPNOTEFOLLOWUP (Optional):56633}        " fat  Use your judgment for foods that are between 5-20% DV per serving  Do include foods that are high in heart healthy fats (mono and poly-unsaturated fats)  These are fats are typically found from plant sources and tend to be liquid at room temperature   Some common foods can include avocados, fatty fish (like salmon), raw and unsalted nuts, nut butters (like peanut and almond butters), seeds (flax and pumpkin seeds), and liquid vegetable oils (canola, corn, olive, peanut, safflower, sesame, soybean, and sunflower oil).  Do not focus on reading labels for cholesterol in foods  The cholesterol found on the food label has little impact on your blood cholesterol levels  Include foods that are high in soluble fiber in your diet  Soluble fiber is not absorbed in your intestines and creates a thick, jelly mass that binds to cholesterol and helps to remove it from the body   Common food sources of soluble fiber include whole grains (oats, barley, quinoa, bran), plant proteins (beans, chick peas, lima beans, soy beans), vegetables (broccoli, brussels sprouts, cabbage, carrots, green beans, okra, onions, parsnips, and many more), starchy vegetables (sweet potatoes, sweet peas) fruit (apples, bananas, oranges, berries, peaches, mangos and so many more), and healthy fats (avocado, lucretia seeds, ground falx seeds)   High sugar beverages can raise your cholesterol levels  Limit sugary beverages such as pop/soda, sweetened tea, and lemonade  Instead, choose water, flavored water, coffee (avoid cream or creamer high in saturated fat), and unsweetened tea  Avoid drinking alcohol as it can raise cholesterol levels  Aim for daily physical activity!    It is recommended for adults to get 150 minutes a week of moderate physical intensity      Educational Handouts Provided: NCM LDL Cholesterol-Lowering MNT     Nutrition Counseling:     Nutrition Counseling Strategies : Nutrition counseling based on motivational interviewing strategy,  Nutrition counseling based on goal setting strategy     Patient Goals:      Readiness to Change : Good  Level of Understanding : Good  Anticipated Compliant : Good         Nutrition Monitoring and Evaluation      Food and Nutrient Intake  Monitoring and Evaluation Plan: Meal/snack pattern  Meal/Snack Pattern: Estimated meal and snack pattern  Criteria: Monitor patient's progress towards meal and snack goal(s)                      Goal Status:           Follow Up: 3 months                [1]   Patient Active Problem List  Diagnosis    Laceration of flexor tendon of right thumb    Laceration of flexor muscle, fascia and tendon of right thumb at forearm level, initial encounter    Uncomplicated asthma (HHS-HCC)    Overweight (BMI 25.0-29.9)    Dietary counseling and surveillance

## 2025-05-02 ENCOUNTER — OFFICE VISIT (OUTPATIENT)
Dept: PRIMARY CARE | Facility: HOSPITAL | Age: 40
End: 2025-05-02
Payer: COMMERCIAL

## 2025-05-02 VITALS
SYSTOLIC BLOOD PRESSURE: 119 MMHG | WEIGHT: 172.1 LBS | TEMPERATURE: 97.7 F | HEART RATE: 85 BPM | BODY MASS INDEX: 26.08 KG/M2 | DIASTOLIC BLOOD PRESSURE: 80 MMHG | HEIGHT: 68 IN

## 2025-05-02 DIAGNOSIS — Z76.89 ENCOUNTER TO ESTABLISH CARE: Primary | ICD-10-CM

## 2025-05-02 DIAGNOSIS — E78.1 HYPERTRIGLYCERIDEMIA: ICD-10-CM

## 2025-05-02 DIAGNOSIS — Z13.21 ENCOUNTER FOR VITAMIN DEFICIENCY SCREENING: ICD-10-CM

## 2025-05-02 DIAGNOSIS — R73.9 HYPERGLYCEMIA: ICD-10-CM

## 2025-05-02 DIAGNOSIS — G47.09 OTHER INSOMNIA: ICD-10-CM

## 2025-05-02 DIAGNOSIS — G47.10 HYPERSOMNIA: ICD-10-CM

## 2025-05-02 PROBLEM — E66.3 OVERWEIGHT (BMI 25.0-29.9): Status: RESOLVED | Noted: 2025-03-18 | Resolved: 2025-05-02

## 2025-05-02 PROCEDURE — 99213 OFFICE O/P EST LOW 20 MIN: CPT | Performed by: INTERNAL MEDICINE

## 2025-05-02 RX ORDER — TRAZODONE HYDROCHLORIDE 50 MG/1
50 TABLET ORAL NIGHTLY PRN
Qty: 90 TABLET | Refills: 0 | Status: SHIPPED | OUTPATIENT
Start: 2025-05-02

## 2025-05-02 RX ORDER — MELATONIN 3 MG
CAPSULE ORAL
Qty: 30 CAPSULE | Refills: 0 | Status: SHIPPED | OUTPATIENT
Start: 2025-05-02

## 2025-05-02 ASSESSMENT — ENCOUNTER SYMPTOMS
CHILLS: 0
SHORTNESS OF BREATH: 0
COUGH: 0
FEVER: 0
POLYDIPSIA: 0
PALPITATIONS: 0

## 2025-05-02 ASSESSMENT — PAIN SCALES - GENERAL: PAINLEVEL_OUTOF10: 2

## 2025-05-02 NOTE — PROGRESS NOTES
Subjective   Patient ID: Bubba Lew is a 39 y.o. male who presents for [facility].    38yo M presents to establish care.     He has an otherwise benign medical history except for the fact he had a work-related injury to his right hand, his dominant hand along the flexor tendon of the thumb and has been working with orthopedic hand surgery occupational therapy and Workmen's Comp. regarding the resolution of this issue.  There is been complications along the management of it in a year out he is still unable to work and has chronic issues associated with this injury.    His big presenting issue at this time is hypersomnia difficulty with sleep chronic insomnia.  He goes 1 to 3 days with very poor sleep quality sleeping 2 or less hours although he is tired he has difficulty initiating sleep his mind is active he cannot turn it off and he lays in bed unable to fall asleep.  He does not feel hyper energized or does not feel as if he does not want to sleep.  It is the opposite he wants to sleep but cannot initiate.  Then after a couple days and this pattern he will crash for 10 to 24 hours due to fatigue and exhaustion.  This sleep cycle has been ongoing for months and it has been very challenging for him.  He saw sleep specialist who ordered a sleep study that was largely unremarkable hypoxia of insignificance O2 saturation at his lowest was about 90%.  He does not report hypersomnia after sleeping he reports being exhausted because he cannot.  He has previously tried over-the-counter options like melatonin before bed which did not sedate him into sleep.  He has otherwise not been on any medications or prescriptions or options for assisting with his sleep management.  We discussed medication options at this time I advised him about proper use of melatonin to help him reinitiate a proper sleep pattern for his brain he will take it as directed 3 hours before desired bedtime, he will start to set good sleep habits with  "sleep hygiene setting a specific bedtime avoiding late caffeine 6 hours before bed and then using a supplement medication for helping initiate and maintain sleep with adjustments to come if necessary.  He will follow-up in person in 6 to 8 weeks we can adjust medications sooner if necessary.         Review of Systems   Constitutional:  Negative for chills and fever.   Respiratory:  Negative for cough and shortness of breath.    Cardiovascular:  Negative for chest pain and palpitations.   Endocrine: Negative for polydipsia and polyuria.       Objective   /80   Pulse 85   Temp 36.5 °C (97.7 °F) (Temporal)   Ht 1.72 m (5' 7.72\")   Wt 78.1 kg (172 lb 1.6 oz)   BMI 26.39 kg/m²     Physical Exam  Constitutional:       Appearance: Normal appearance.   HENT:      Head: Normocephalic and atraumatic.   Eyes:      Extraocular Movements: Extraocular movements intact.      Pupils: Pupils are equal, round, and reactive to light.   Neck:      Thyroid: No thyroid mass or thyromegaly.   Cardiovascular:      Rate and Rhythm: Normal rate and regular rhythm.      Heart sounds: No murmur heard.     No friction rub. No gallop.   Pulmonary:      Effort: No respiratory distress.      Breath sounds: No wheezing, rhonchi or rales.   Musculoskeletal:      Cervical back: Neck supple.      Right lower leg: No edema.      Left lower leg: No edema.   Neurological:      Mental Status: He is alert.         Assessment/Plan   Assessment & Plan  Encounter to establish care         Other insomnia    Orders:    Vitamin D 25-Hydroxy,Total (for eval of Vitamin D levels); Future    Vitamin B12; Future    Iron level; Future    Comprehensive Metabolic Panel; Future    Hemoglobin A1C; Future    Lipid Panel; Future    melatonin 3 mg capsule; 1 capsule 3 hours before desired bedtime    traZODone (Desyrel) 50 mg tablet; Take 1 tablet (50 mg) by mouth as needed at bedtime for sleep.    Hypersomnia    Orders:    Vitamin D 25-Hydroxy,Total (for eval of " Vitamin D levels); Future    Vitamin B12; Future    Iron level; Future    Comprehensive Metabolic Panel; Future    Hemoglobin A1C; Future    Lipid Panel; Future    Hyperglycemia    Orders:    Vitamin D 25-Hydroxy,Total (for eval of Vitamin D levels); Future    Vitamin B12; Future    Iron level; Future    Comprehensive Metabolic Panel; Future    Hemoglobin A1C; Future    Lipid Panel; Future    Hypertriglyceridemia    Orders:    Vitamin D 25-Hydroxy,Total (for eval of Vitamin D levels); Future    Vitamin B12; Future    Iron level; Future    Comprehensive Metabolic Panel; Future    Hemoglobin A1C; Future    Lipid Panel; Future    Encounter for vitamin deficiency screening    Orders:    Vitamin D 25-Hydroxy,Total (for eval of Vitamin D levels); Future    Vitamin B12; Future    Iron level; Future    Comprehensive Metabolic Panel; Future    Hemoglobin A1C; Future    Lipid Panel; Future

## 2025-06-26 ENCOUNTER — HOSPITAL ENCOUNTER (EMERGENCY)
Facility: HOSPITAL | Age: 40
Discharge: HOME | End: 2025-06-26
Payer: COMMERCIAL

## 2025-06-26 VITALS
WEIGHT: 160 LBS | OXYGEN SATURATION: 99 % | BODY MASS INDEX: 25.11 KG/M2 | SYSTOLIC BLOOD PRESSURE: 132 MMHG | RESPIRATION RATE: 18 BRPM | HEART RATE: 75 BPM | HEIGHT: 67 IN | DIASTOLIC BLOOD PRESSURE: 82 MMHG | TEMPERATURE: 98.1 F

## 2025-06-26 DIAGNOSIS — S05.01XA ABRASION OF RIGHT CORNEA, INITIAL ENCOUNTER: Primary | ICD-10-CM

## 2025-06-26 PROCEDURE — 99283 EMERGENCY DEPT VISIT LOW MDM: CPT

## 2025-06-26 PROCEDURE — 2500000005 HC RX 250 GENERAL PHARMACY W/O HCPCS: Performed by: NURSE PRACTITIONER

## 2025-06-26 RX ORDER — ERYTHROMYCIN 5 MG/G
1 OINTMENT OPHTHALMIC ONCE
Status: COMPLETED | OUTPATIENT
Start: 2025-06-26 | End: 2025-06-26

## 2025-06-26 RX ORDER — TETRACAINE HYDROCHLORIDE 5 MG/ML
2 SOLUTION OPHTHALMIC ONCE
Status: COMPLETED | OUTPATIENT
Start: 2025-06-26 | End: 2025-06-26

## 2025-06-26 RX ORDER — OLOPATADINE HYDROCHLORIDE 1 MG/ML
1 SOLUTION OPHTHALMIC 2 TIMES DAILY
Qty: 5 ML | Refills: 0 | Status: SHIPPED | OUTPATIENT
Start: 2025-06-26 | End: 2026-06-26

## 2025-06-26 RX ADMIN — TETRACAINE HYDROCHLORIDE 2 DROP: 5 SOLUTION OPHTHALMIC at 06:29

## 2025-06-26 RX ADMIN — ERYTHROMYCIN 1 CM: 5 OINTMENT OPHTHALMIC at 06:29

## 2025-06-26 RX ADMIN — FLUORESCEIN SODIUM 1 STRIP: 1 STRIP OPHTHALMIC at 06:31

## 2025-06-26 ASSESSMENT — PAIN SCALES - GENERAL: PAINLEVEL_OUTOF10: 9

## 2025-06-26 ASSESSMENT — PAIN DESCRIPTION - ORIENTATION: ORIENTATION: RIGHT;LEFT

## 2025-06-26 ASSESSMENT — PAIN DESCRIPTION - LOCATION: LOCATION: EYE

## 2025-06-26 ASSESSMENT — PAIN DESCRIPTION - PROGRESSION: CLINICAL_PROGRESSION: GRADUALLY WORSENING

## 2025-06-26 ASSESSMENT — PAIN DESCRIPTION - PAIN TYPE: TYPE: ACUTE PAIN

## 2025-06-26 ASSESSMENT — VISUAL ACUITY: OU: 1

## 2025-06-26 ASSESSMENT — PAIN DESCRIPTION - ONSET: ONSET: GRADUAL

## 2025-06-26 ASSESSMENT — PAIN - FUNCTIONAL ASSESSMENT: PAIN_FUNCTIONAL_ASSESSMENT: 0-10

## 2025-06-26 ASSESSMENT — PAIN DESCRIPTION - FREQUENCY: FREQUENCY: CONSTANT/CONTINUOUS

## 2025-06-26 NOTE — ED PROVIDER NOTES
HPI   Chief Complaint   Patient presents with    Eye Pain       39-year-old male with no significant past medical history presents the emergency department today for eye irritation.  States that he was outside working the past 2 days.  States that he then took a shower got soap in his eyes and was rubbing them consistently throughout the night trying to flush them with cool water with no relief.  He has had constant tearing from bilateral eyes.  Positive photophobia.  He is having some eyelid swelling as well.  No current foreign body sensation.  Denies any vision changes.  No headache, nausea or vomiting.  Denies any fever or chills.  No traumatic injury.  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                          [place]ully Coma Scale Score: 15                  Patient History   Medical History[1]  Surgical History[2]  Family History[3]  Social History[4]    Physical Exam   ED Triage Vitals [06/26/25 0423]   Temperature Heart Rate Respirations BP   36.7 °C (98.1 °F) 73 18 134/89      Pulse Ox Temp Source Heart Rate Source Patient Position   99 % Temporal Monitor Sitting      BP Location FiO2 (%)     Left arm --       Physical Exam  Vitals reviewed.   Constitutional:       Appearance: Normal appearance.   HENT:      Head: Normocephalic.   Eyes:      General: Lids are normal. Lids are everted, no foreign bodies appreciated. Vision grossly intact.         Right eye: Discharge present. No foreign body.         Left eye: Discharge present.No foreign body.      Extraocular Movements: Extraocular movements intact.      Conjunctiva/sclera:      Right eye: Right conjunctiva is injected.      Left eye: Left conjunctiva is injected.      Pupils: Pupils are equal, round, and reactive to light.      Right eye: Corneal abrasion and fluorescein uptake present. Kendy exam negative.      Left eye: Corneal abrasion and fluorescein uptake present. Kendy exam negative.     Slit lamp exam:     Right eye:  Photophobia present.      Left eye: Photophobia present.   Cardiovascular:      Rate and Rhythm: Normal rate and regular rhythm.   Pulmonary:      Effort: Pulmonary effort is normal.      Breath sounds: Normal breath sounds.   Abdominal:      General: Abdomen is flat.      Palpations: Abdomen is soft.   Skin:     General: Skin is warm and dry.      Capillary Refill: Capillary refill takes less than 2 seconds.   Neurological:      Mental Status: He is alert.         Labs Reviewed - No data to display  Pain Management Panel           No data to display              No orders to display       ED Course & MDM   Diagnoses as of 06/26/25 0625   Abrasion of right cornea, initial encounter       Medical Decision Making  On initial evaluation patient is well-appearing emerged part at this time.  Patient does have bilateral conjunctivitis with clear tearing and top eyelid swelling.  Does have a corneal abrasion to the bilateral eyes the right is at [place] position and the left is at the 7 o'clock position.  There is no obvious foreign body noted at this time.  Does have characteristics of allergic conjunctivitis as well.  After tetracaine instillation has immediate relief in his discomfort.  Patient visual acuities are within normal limits.  He was given erythromycin ointment in the ED and discharged home with a prescription for olopatadine.  He is to use erythromycin ointment 4 times a day for the next 5 days and will follow-up with ophthalmology.  He is aware strict return precautions and outpatient follow-up ultimately patient will be discharged home in improved condition.        Procedure  Procedures      Differential Diagnoses include corneal abrasion, allergic conjunctivitis, keratitis, chemosis  This is not an exhaustive list of all the diagnosis and therapeutics are considered during the patient's evaluation for an emergency medical condition.    I discussed the differential, results and discharge plan with the  patient and/or family/friend/caregiver if present.  I emphasized the importance of follow-up with the physician I referred them to in the timeframe recommended.  I explained reasons for the patient to return to the Emergency Department. Additional verbal discharge instructions were also given and discussed with the patient to supplement those generated by the EMR. We also discussed medications that were prescribed (if any) including common side effects and interactions. The patient was advised to abstain from driving, operating heavy machinery or making significant decisions while taking medications such as opiates and muscle relaxers that may impair this. All questions were addressed.  They understand return precautions and discharge instructions. The patient and/or family/friend/caregiver expressed understanding.             [1]   Past Medical History:  Diagnosis Date    Asthma     Back pain     Congenital spondylolisthesis     Fibromyalgia, primary     Laceration of flexor tendon of right thumb     Tonsillitis    [2]   Past Surgical History:  Procedure Laterality Date    HAND SURGERY Right     TONSILLECTOMY     [3]   Family History  Problem Relation Name Age of Onset    No Known Problems Mother      No Known Problems Father     [4]   Social History  Tobacco Use    Smoking status: Never     Passive exposure: Never    Smokeless tobacco: Current     Types: Chew   Vaping Use    Vaping status: Never Used   Substance Use Topics    Alcohol use: Not Currently    Drug use: Never        CÉSAR José-CNP  06/26/25 0656

## 2025-06-26 NOTE — ED TRIAGE NOTES
Pt arrived to ED via private vehicle for c/o eye pain/irritation. 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 Pt states later this evening he went to get a shower and his eyes started to hurt/get more irritated and wasn't sure if he go soap in them. Pt states he's not sure if he scratched them. Pt states that he did try to flush them with cool water with no relief.

## 2025-07-16 ENCOUNTER — APPOINTMENT (OUTPATIENT)
Dept: SLEEP MEDICINE | Facility: CLINIC | Age: 40
End: 2025-07-16
Payer: COMMERCIAL

## 2025-07-18 ENCOUNTER — APPOINTMENT (OUTPATIENT)
Dept: NUTRITION | Facility: HOSPITAL | Age: 40
End: 2025-07-18
Payer: COMMERCIAL

## 2025-07-30 DIAGNOSIS — G47.09 OTHER INSOMNIA: ICD-10-CM

## 2025-07-30 RX ORDER — TRAZODONE HYDROCHLORIDE 50 MG/1
50 TABLET ORAL NIGHTLY PRN
Qty: 90 TABLET | Refills: 0 | Status: SHIPPED | OUTPATIENT
Start: 2025-07-30

## (undated) DEVICE — SUTURE, ETHILON, 4-0, BLK, MONO, PS-2 18

## (undated) DEVICE — BANDAGE, ELASTIC, MATRIX, SELF-CLOSURE, 2 IN X 5 YD, LF

## (undated) DEVICE — COVER HANDLE LIGHT, STERIS, BLUE, STERILE

## (undated) DEVICE — GLOVE, SURGICAL, PROTEXIS PI , 7.5, PF, LF

## (undated) DEVICE — GLOVE, SURGICAL, PROTEXIS PI BLUE W/NEUTHERA, 8.0, PF, LF

## (undated) DEVICE — Device

## (undated) DEVICE — DRESSING, GAUZE, SPONGE, 12 PLY, 4 X 4 IN, PLASTIC POUCH, STRL 10PK

## (undated) DEVICE — APPLICATOR, CHLORAPREP, W/ORANGE TINT, 26ML

## (undated) DEVICE — STRIP, SKIN CLOSURE, STERI STRIP, REINFORCED, 0.5 X 4 IN

## (undated) DEVICE — SUTURE, MONOCRYL, 4-0, 27 IN, PS-2, UNDYED

## (undated) DEVICE — GLOVE, SURGICAL, PROTEXIS PI MICRO, 7.5, PF, LF

## (undated) DEVICE — SOLUTION, IRRIGATION, SODIUM CHLORIDE 0.9%, 1000 ML, POUR BOTTLE

## (undated) DEVICE — CUFF, TOURNIQUET, 18 X 4, DUAL PORT/SNGL BLADDER, DISP, LF

## (undated) DEVICE — ADHESIVE, SKIN, MASTISOL, 2/3 CC VIAL

## (undated) DEVICE — SLEEVE, SCD EXPRESS, KNEE LENGTH-MEDIUM

## (undated) DEVICE — DRAPE KIT, MINI C-ARM

## (undated) DEVICE — SYRINGE, 20 CC, LUER LOCK

## (undated) DEVICE — SUTURE, ETHIBOND, 3-0, 36 IN, RB1, DA, GREEN

## (undated) DEVICE — TOWEL, SURGICAL, NEURO, O/R, 16 X 26, BLUE, STERILE

## (undated) DEVICE — SPONGE, GAUZE, 12 PLY, 4 X 4, STERILE, DISP

## (undated) DEVICE — SUTURE, VICRYL, 3-0, 27 IN, SH

## (undated) DEVICE — CORD, CAUTERY, BIOPOLAR FORCEP, 12FT

## (undated) DEVICE — SUTURE, VICRYL, 2-0, 27 IN, SH, UNDYED

## (undated) DEVICE — SUTURE, ETHIBOND XTRA, 3-0, 30 IN, SH

## (undated) DEVICE — KNIFE, OPHTHALMIC, CRESCENT, ANGLED, BEVEL UP, SATIN

## (undated) DEVICE — RETRIEVER, SUTURE, HEWSON

## (undated) DEVICE — SLING, ARM, MEDIUM

## (undated) DEVICE — BANDAGE, ELASTIC, MATRIX, SELF-CLOSURE, 4 IN X 5 YD, LF

## (undated) DEVICE — PADDING, WEBRIL, UNDERCAST, STERILE, 3 IN

## (undated) DEVICE — BLADE, OPHTHALMIC, BEAVER, MINI, SHARP ONE SIDE

## (undated) DEVICE — SUTURE, MONOCRYL, 4-0, 18 IN, PS2, UNDYED

## (undated) DEVICE — SUTURE, VICRYL, 3-0,18 IN, SH, UNDYED